# Patient Record
Sex: FEMALE | Race: WHITE | Employment: UNEMPLOYED | URBAN - METROPOLITAN AREA
[De-identification: names, ages, dates, MRNs, and addresses within clinical notes are randomized per-mention and may not be internally consistent; named-entity substitution may affect disease eponyms.]

---

## 2020-05-29 ENCOUNTER — ED HISTORICAL/CONVERTED ENCOUNTER (OUTPATIENT)
Dept: OTHER | Age: 25
End: 2020-05-29

## 2022-02-02 ENCOUNTER — HOSPITAL ENCOUNTER (INPATIENT)
Age: 27
LOS: 4 days | Discharge: HOME | DRG: 885 | End: 2022-02-07
Attending: EMERGENCY MEDICINE | Admitting: PSYCHIATRY & NEUROLOGY
Payer: COMMERCIAL

## 2022-02-02 ENCOUNTER — APPOINTMENT (OUTPATIENT)
Dept: CT IMAGING | Age: 27
DRG: 885 | End: 2022-02-02
Attending: EMERGENCY MEDICINE
Payer: COMMERCIAL

## 2022-02-02 DIAGNOSIS — F32.1 CURRENT MODERATE EPISODE OF MAJOR DEPRESSIVE DISORDER, UNSPECIFIED WHETHER RECURRENT (HCC): Primary | ICD-10-CM

## 2022-02-02 DIAGNOSIS — R45.851 SUICIDAL IDEATION: ICD-10-CM

## 2022-02-02 LAB
ANION GAP SERPL CALC-SCNC: 2 MMOL/L (ref 5–15)
BASOPHILS # BLD: 0 K/UL (ref 0–0.1)
BASOPHILS NFR BLD: 0 % (ref 0–1)
BUN SERPL-MCNC: 10 MG/DL (ref 6–20)
BUN/CREAT SERPL: 10 (ref 12–20)
CA-I BLD-MCNC: 9 MG/DL (ref 8.5–10.1)
CHLORIDE SERPL-SCNC: 110 MMOL/L (ref 97–108)
CO2 SERPL-SCNC: 25 MMOL/L (ref 21–32)
CREAT SERPL-MCNC: 0.97 MG/DL (ref 0.55–1.02)
DIFFERENTIAL METHOD BLD: ABNORMAL
EOSINOPHIL # BLD: 0.1 K/UL (ref 0–0.4)
EOSINOPHIL NFR BLD: 1 % (ref 0–7)
ERYTHROCYTE [DISTWIDTH] IN BLOOD BY AUTOMATED COUNT: 12.4 % (ref 11.5–14.5)
GLUCOSE SERPL-MCNC: 80 MG/DL (ref 65–100)
HCT VFR BLD AUTO: 45.8 % (ref 35–47)
HGB BLD-MCNC: 15.5 G/DL (ref 11.5–16)
IMM GRANULOCYTES # BLD AUTO: 0 K/UL (ref 0–0.04)
IMM GRANULOCYTES NFR BLD AUTO: 0 % (ref 0–0.5)
LYMPHOCYTES # BLD: 2.5 K/UL (ref 0.8–3.5)
LYMPHOCYTES NFR BLD: 19 % (ref 12–49)
MCH RBC QN AUTO: 31.1 PG (ref 26–34)
MCHC RBC AUTO-ENTMCNC: 33.8 G/DL (ref 30–36.5)
MCV RBC AUTO: 91.8 FL (ref 80–99)
MONOCYTES # BLD: 0.6 K/UL (ref 0–1)
MONOCYTES NFR BLD: 5 % (ref 5–13)
NEUTS SEG # BLD: 9.6 K/UL (ref 1.8–8)
NEUTS SEG NFR BLD: 75 % (ref 32–75)
NRBC # BLD: 0 K/UL (ref 0–0.01)
NRBC BLD-RTO: 0 PER 100 WBC
PLATELET # BLD AUTO: 285 K/UL (ref 150–400)
PMV BLD AUTO: 10.1 FL (ref 8.9–12.9)
POTASSIUM SERPL-SCNC: 3.9 MMOL/L (ref 3.5–5.1)
RBC # BLD AUTO: 4.99 M/UL (ref 3.8–5.2)
SODIUM SERPL-SCNC: 137 MMOL/L (ref 136–145)
WBC # BLD AUTO: 12.8 K/UL (ref 3.6–11)

## 2022-02-02 PROCEDURE — 87636 SARSCOV2 & INF A&B AMP PRB: CPT

## 2022-02-02 PROCEDURE — 81025 URINE PREGNANCY TEST: CPT

## 2022-02-02 PROCEDURE — 36415 COLL VENOUS BLD VENIPUNCTURE: CPT

## 2022-02-02 PROCEDURE — 80177 DRUG SCRN QUAN LEVETIRACETAM: CPT

## 2022-02-02 PROCEDURE — 70450 CT HEAD/BRAIN W/O DYE: CPT

## 2022-02-02 PROCEDURE — 85025 COMPLETE CBC W/AUTO DIFF WBC: CPT

## 2022-02-02 PROCEDURE — 80143 DRUG ASSAY ACETAMINOPHEN: CPT

## 2022-02-02 PROCEDURE — 80179 DRUG ASSAY SALICYLATE: CPT

## 2022-02-02 PROCEDURE — 80307 DRUG TEST PRSMV CHEM ANLYZR: CPT

## 2022-02-02 PROCEDURE — 81003 URINALYSIS AUTO W/O SCOPE: CPT

## 2022-02-02 PROCEDURE — 80048 BASIC METABOLIC PNL TOTAL CA: CPT

## 2022-02-02 PROCEDURE — 93005 ELECTROCARDIOGRAM TRACING: CPT

## 2022-02-02 PROCEDURE — 99284 EMERGENCY DEPT VISIT MOD MDM: CPT

## 2022-02-02 RX ORDER — ALPRAZOLAM 1 MG/1
1 TABLET ORAL 3 TIMES DAILY
COMMUNITY
End: 2022-02-07

## 2022-02-02 RX ORDER — NORTRIPTYLINE HYDROCHLORIDE 75 MG/1
100 CAPSULE ORAL
Status: ON HOLD | COMMUNITY
End: 2022-02-07 | Stop reason: SDUPTHER

## 2022-02-02 RX ORDER — FREMANEZUMAB-VFRM 225 MG/1.5ML
225 INJECTION SUBCUTANEOUS
COMMUNITY
Start: 2022-01-17 | End: 2022-02-07

## 2022-02-02 RX ORDER — BENZONATATE 100 MG/1
CAPSULE ORAL
COMMUNITY
End: 2022-02-07

## 2022-02-02 RX ORDER — ESCITALOPRAM OXALATE 20 MG/1
TABLET ORAL
COMMUNITY
End: 2022-02-07

## 2022-02-02 RX ORDER — METOPROLOL TARTRATE 25 MG/1
12.5 TABLET, FILM COATED ORAL 2 TIMES DAILY
COMMUNITY
End: 2022-02-07

## 2022-02-02 RX ORDER — TOPIRAMATE 100 MG/1
100 TABLET, FILM COATED ORAL 2 TIMES DAILY
COMMUNITY
Start: 2021-06-07 | End: 2022-02-07

## 2022-02-02 RX ORDER — ESTRADIOL VALERATE AND ESTRADIOL VALERATE/DIENOGEST 3-2-1(28)
1 KIT ORAL DAILY
COMMUNITY
Start: 2021-05-05

## 2022-02-02 RX ORDER — ONDANSETRON 4 MG/1
TABLET, ORALLY DISINTEGRATING ORAL
COMMUNITY
End: 2022-02-07

## 2022-02-02 RX ORDER — LEVETIRACETAM 500 MG/1
500 TABLET, EXTENDED RELEASE ORAL 2 TIMES DAILY
COMMUNITY
End: 2022-02-07

## 2022-02-03 PROBLEM — R45.851 SUICIDAL IDEATIONS: Status: ACTIVE | Noted: 2022-02-03

## 2022-02-03 PROBLEM — F32.9 MAJOR DEPRESSION: Status: ACTIVE | Noted: 2022-02-03

## 2022-02-03 PROBLEM — T14.91XA SUICIDE ATTEMPT (HCC): Status: ACTIVE | Noted: 2022-02-03

## 2022-02-03 LAB
AMPHET UR QL SCN: NEGATIVE
APAP SERPL-MCNC: <10 UG/ML (ref 10–30)
APPEARANCE UR: CLEAR
ATRIAL RATE: 79 BPM
BACTERIA URNS QL MICRO: ABNORMAL /HPF
BARBITURATES UR QL SCN: NEGATIVE
BENZODIAZ UR QL: POSITIVE
BILIRUB UR QL: NEGATIVE
CALCULATED P AXIS, ECG09: 67 DEGREES
CALCULATED R AXIS, ECG10: 74 DEGREES
CALCULATED T AXIS, ECG11: 48 DEGREES
CANNABINOIDS UR QL SCN: POSITIVE
COCAINE UR QL SCN: NEGATIVE
COLOR UR: ABNORMAL
DIAGNOSIS, 93000: NORMAL
DRUG SCRN COMMENT,DRGCM: ABNORMAL
FLUAV RNA SPEC QL NAA+PROBE: NOT DETECTED
FLUBV RNA SPEC QL NAA+PROBE: NOT DETECTED
GLUCOSE UR STRIP.AUTO-MCNC: NEGATIVE MG/DL
HCG UR QL: NEGATIVE
HGB UR QL STRIP: NEGATIVE
KETONES UR QL STRIP.AUTO: NEGATIVE MG/DL
LEUKOCYTE ESTERASE UR QL STRIP.AUTO: NEGATIVE
METHADONE UR QL: NEGATIVE
NITRITE UR QL STRIP.AUTO: NEGATIVE
OPIATES UR QL: NEGATIVE
P-R INTERVAL, ECG05: 120 MS
PCP UR QL: NEGATIVE
PH UR STRIP: 6 [PH] (ref 5–8)
PROT UR STRIP-MCNC: NEGATIVE MG/DL
Q-T INTERVAL, ECG07: 448 MS
QRS DURATION, ECG06: 90 MS
QTC CALCULATION (BEZET), ECG08: 513 MS
RBC #/AREA URNS HPF: ABNORMAL /HPF (ref 0–5)
SALICYLATES SERPL-MCNC: <1.7 MG/DL (ref 2.8–20)
SARS-COV-2, COV2: NOT DETECTED
SP GR UR REFRACTOMETRY: 1 (ref 1–1.03)
UROBILINOGEN UR QL STRIP.AUTO: 0.1 EU/DL (ref 0.1–1)
VENTRICULAR RATE, ECG03: 79 BPM
WBC URNS QL MICRO: ABNORMAL /HPF (ref 0–4)

## 2022-02-03 PROCEDURE — 65220000003 HC RM SEMIPRIVATE PSYCH

## 2022-02-03 PROCEDURE — 74011250637 HC RX REV CODE- 250/637: Performed by: PSYCHIATRY & NEUROLOGY

## 2022-02-03 RX ORDER — LORAZEPAM 2 MG/ML
1 INJECTION INTRAMUSCULAR
Status: DISCONTINUED | OUTPATIENT
Start: 2022-02-03 | End: 2022-02-07 | Stop reason: HOSPADM

## 2022-02-03 RX ORDER — ACETAMINOPHEN 325 MG/1
650 TABLET ORAL
Status: DISCONTINUED | OUTPATIENT
Start: 2022-02-03 | End: 2022-02-07 | Stop reason: HOSPADM

## 2022-02-03 RX ORDER — TRAZODONE HYDROCHLORIDE 50 MG/1
50 TABLET ORAL
Status: DISCONTINUED | OUTPATIENT
Start: 2022-02-03 | End: 2022-02-07 | Stop reason: HOSPADM

## 2022-02-03 RX ORDER — POTASSIUM CHLORIDE 20 MEQ/1
20 TABLET, EXTENDED RELEASE ORAL DAILY
Status: DISCONTINUED | OUTPATIENT
Start: 2022-02-04 | End: 2022-02-07 | Stop reason: HOSPADM

## 2022-02-03 RX ORDER — TRAZODONE HYDROCHLORIDE 50 MG/1
50 TABLET ORAL
Status: DISCONTINUED | OUTPATIENT
Start: 2022-02-03 | End: 2022-02-03

## 2022-02-03 RX ORDER — SPIRONOLACTONE 25 MG/1
100 TABLET ORAL DAILY
Status: DISCONTINUED | OUTPATIENT
Start: 2022-02-03 | End: 2022-02-07 | Stop reason: HOSPADM

## 2022-02-03 RX ORDER — LANOLIN ALCOHOL/MO/W.PET/CERES
3 CREAM (GRAM) TOPICAL
Status: DISCONTINUED | OUTPATIENT
Start: 2022-02-03 | End: 2022-02-07 | Stop reason: HOSPADM

## 2022-02-03 RX ORDER — METOPROLOL TARTRATE 25 MG/1
12.5 TABLET, FILM COATED ORAL 2 TIMES DAILY
Status: DISCONTINUED | OUTPATIENT
Start: 2022-02-03 | End: 2022-02-07 | Stop reason: HOSPADM

## 2022-02-03 RX ORDER — ADHESIVE BANDAGE
30 BANDAGE TOPICAL DAILY PRN
Status: DISCONTINUED | OUTPATIENT
Start: 2022-02-03 | End: 2022-02-07 | Stop reason: HOSPADM

## 2022-02-03 RX ORDER — HYDROXYZINE 50 MG/1
50 TABLET, FILM COATED ORAL
Status: DISCONTINUED | OUTPATIENT
Start: 2022-02-03 | End: 2022-02-07 | Stop reason: HOSPADM

## 2022-02-03 RX ORDER — TOPIRAMATE 100 MG/1
100 TABLET, FILM COATED ORAL 2 TIMES DAILY
Status: DISCONTINUED | OUTPATIENT
Start: 2022-02-03 | End: 2022-02-07 | Stop reason: HOSPADM

## 2022-02-03 RX ORDER — BUSPIRONE HYDROCHLORIDE 30 MG/1
30 TABLET ORAL 2 TIMES DAILY
COMMUNITY
End: 2022-02-07

## 2022-02-03 RX ORDER — ARIPIPRAZOLE 2 MG/1
2 TABLET ORAL DAILY
Status: DISCONTINUED | OUTPATIENT
Start: 2022-02-04 | End: 2022-02-07 | Stop reason: HOSPADM

## 2022-02-03 RX ORDER — OLANZAPINE 5 MG/1
5 TABLET ORAL
Status: DISCONTINUED | OUTPATIENT
Start: 2022-02-03 | End: 2022-02-07 | Stop reason: HOSPADM

## 2022-02-03 RX ORDER — OMEPRAZOLE 40 MG/1
40 CAPSULE, DELAYED RELEASE ORAL 2 TIMES DAILY
COMMUNITY
End: 2022-02-07

## 2022-02-03 RX ORDER — PANTOPRAZOLE SODIUM 40 MG/1
40 TABLET, DELAYED RELEASE ORAL
Status: DISCONTINUED | OUTPATIENT
Start: 2022-02-03 | End: 2022-02-07 | Stop reason: HOSPADM

## 2022-02-03 RX ORDER — HALOPERIDOL 5 MG/ML
5 INJECTION INTRAMUSCULAR
Status: DISCONTINUED | OUTPATIENT
Start: 2022-02-03 | End: 2022-02-03

## 2022-02-03 RX ORDER — HYDROXYZINE 50 MG/1
50 TABLET, FILM COATED ORAL
Status: DISCONTINUED | OUTPATIENT
Start: 2022-02-03 | End: 2022-02-03

## 2022-02-03 RX ORDER — ACETAMINOPHEN 325 MG/1
650 TABLET ORAL
Status: DISCONTINUED | OUTPATIENT
Start: 2022-02-03 | End: 2022-02-03

## 2022-02-03 RX ORDER — SPIRONOLACTONE 100 MG/1
100 TABLET, FILM COATED ORAL DAILY
COMMUNITY
End: 2022-02-07

## 2022-02-03 RX ORDER — ADHESIVE BANDAGE
30 BANDAGE TOPICAL DAILY PRN
Status: DISCONTINUED | OUTPATIENT
Start: 2022-02-03 | End: 2022-02-03

## 2022-02-03 RX ORDER — LEVETIRACETAM 500 MG/1
500 TABLET ORAL 2 TIMES DAILY
Status: DISCONTINUED | OUTPATIENT
Start: 2022-02-03 | End: 2022-02-07 | Stop reason: HOSPADM

## 2022-02-03 RX ORDER — ALPRAZOLAM 0.25 MG/1
0.5 TABLET ORAL 4 TIMES DAILY
Status: DISCONTINUED | OUTPATIENT
Start: 2022-02-03 | End: 2022-02-07 | Stop reason: HOSPADM

## 2022-02-03 RX ORDER — ESCITALOPRAM OXALATE 10 MG/1
20 TABLET ORAL DAILY
Status: DISCONTINUED | OUTPATIENT
Start: 2022-02-03 | End: 2022-02-07 | Stop reason: HOSPADM

## 2022-02-03 RX ADMIN — PANTOPRAZOLE SODIUM 40 MG: 40 TABLET, DELAYED RELEASE ORAL at 16:33

## 2022-02-03 RX ADMIN — ALPRAZOLAM 0.5 MG: 0.25 TABLET ORAL at 21:43

## 2022-02-03 RX ADMIN — ESCITALOPRAM OXALATE 20 MG: 10 TABLET ORAL at 16:33

## 2022-02-03 RX ADMIN — MELATONIN TAB 3 MG 3 MG: 3 TAB at 21:44

## 2022-02-03 RX ADMIN — ALPRAZOLAM 0.5 MG: 0.25 TABLET ORAL at 16:33

## 2022-02-03 RX ADMIN — METOPROLOL TARTRATE 12.5 MG: 25 TABLET, FILM COATED ORAL at 21:43

## 2022-02-03 RX ADMIN — SPIRONOLACTONE 100 MG: 25 TABLET ORAL at 16:33

## 2022-02-03 RX ADMIN — LEVETIRACETAM 500 MG: 500 TABLET, FILM COATED ORAL at 21:45

## 2022-02-03 RX ADMIN — TOPIRAMATE 100 MG: 100 TABLET, FILM COATED ORAL at 21:44

## 2022-02-03 NOTE — CONSULTS
Consult Date: 2/3/2022    Chief Complaint: Gait problem  Chief Complaint   Patient presents with    Mental Health Problem       HPI: HPI patient is a 23years old white female who has been admitted here for psychiatry evaluation and treatment. For suicidal ideation. She took quite a few of Lawence Glee as well as Lexapro and took 6 BuSpar patient has history of most likely subdural hemorrhage and brain surgery in 2014. After that she has history of gait problem also. No history of chest pain cough fever or chills. No history of shortness of breath no history of nausea vomiting diarrhea abdominal pain or black stool no history of increased frequency micturition or painful micturition. No history of joint pain or joint swelling. No history of headache. No history of loss of consciousness or seizures. No history of change in vision no history of ear or nasal discharge no history of throat pain or earache. ROS:ROS   Constitutional: Negative  HEENT: Negative  CVS: Negative  RS: Negative  GI: Negative  : Negative  Musculoskeletal: Negative  Immunology: Records are not available  Neurology: Gait problem  Endocrine: Negative  Haem-Onc: Negative  Skin: Negative  Psychiatry: Depression with suicidal ideation  Allergies  Allergies   Allergen Reactions    Contrast Agent [Iodine] Unknown (comments)     FAMILY HISTORY:  History reviewed. No pertinent family history.   Father has hypertension as well as type 2 diabetes mother had some kind of bone cancer  SOCIAL HISTORY:  Social History     Socioeconomic History    Marital status: SINGLE     Spouse name: Not on file    Number of children: Not on file    Years of education: Not on file    Highest education level: Not on file   Occupational History    Not on file   Tobacco Use    Smoking status: Never Smoker    Smokeless tobacco: Never Used   Substance and Sexual Activity    Alcohol use: Not Currently    Drug use: Yes     Types: Marijuana    Sexual activity: Not on file Other Topics Concern    Not on file   Social History Narrative    Not on file     Social Determinants of Health     Financial Resource Strain:     Difficulty of Paying Living Expenses: Not on file   Food Insecurity:     Worried About Running Out of Food in the Last Year: Not on file    Adonis of Food in the Last Year: Not on file   Transportation Needs:     Lack of Transportation (Medical): Not on file    Lack of Transportation (Non-Medical): Not on file   Physical Activity:     Days of Exercise per Week: Not on file    Minutes of Exercise per Session: Not on file   Stress:     Feeling of Stress : Not on file   Social Connections:     Frequency of Communication with Friends and Family: Not on file    Frequency of Social Gatherings with Friends and Family: Not on file    Attends Congregation Services: Not on file    Active Member of [address] [place] [address] or Organizations: Not on file    Attends Club or Organization Meetings: Not on file    Marital Status: Not on file   Intimate Partner Violence:     Fear of Current or Ex-Partner: Not on file    Emotionally Abused: Not on file    Physically Abused: Not on file    Sexually Abused: Not on file   Housing Stability:     Unable to Pay for Housing in the Last Year: Not on file    Number of [place] in the Last Year: Not on file    Unstable Housing in the Last Year: Not on file     No history of alcohol abuse no history of smoking positive history of smoking marijuana occasionally  SURGICAL HISTORY:  Past Surgical History:   Procedure Laterality Date    HX HEENT       tonsilectomy and adenoidectomy     PMH:  Past Medical History:   Diagnosis Date    Other ill-defined conditions(799.89) 10/2010    left tympanic membrane defect    Other ill-defined conditions(799.89)     TM perforation    Suicidal behavior with attempted self-injury ([facility] [address]     Suicidal ideation      Home Medications   Prior to Admission medications    Medication Sig Start Date End Date Taking? Authorizing Provider   Estradiol Valerate-Dienogest Chuck Montgomerys) 3 mg/2 mg-2 mg/ 2 mg-3 mg/1 mg tab Take 1 Tablet by mouth daily. 5/5/21  Yes Other, MD roosevelt Muhammadumab-vfrm (Ajovy Syringe) 225 mg/1.5 mL syrg injection 225 mg by SubCUTAneous route. 1/17/22  Yes Other, MD Sabina   topiramate (TOPAMAX) 100 mg tablet topiramate 100 mg tablet 6/7/21  Yes Other, MD Sabina   ubrogepant Shelvy Heaps) 100 mg tablet Take 100 mg by mouth daily as needed. 12/28/21 3/28/22 Yes Other, MD Sabina   ALPRAZolam (Xanax) 1 mg tablet     Other, MD Sabina   benzonatate (TESSALON) 100 mg capsule benzonatate 100 mg capsule    Other, MD Sabina   cariprazine (Vraylar) 4.5 mg capsule     Other, MD Sabina   escitalopram oxalate (Lexapro) 20 mg tablet     Other, MD Sabina   levETIRAcetam (keppra xr) 500 mg ER tablet     Other, MD Sabina   linaCLOtide (Linzess) 290 mcg cap capsule Linzess 290 mcg capsule    Other, MD Sabina   metoprolol tartrate (LOPRESSOR) 25 mg tablet     Other, MD Sabina   sucralfate (CARAFATE PO) Carafate    Other, MD Sabina   POTASSIUM CARBONATE     Other, MD Sabina   ondansetron (ZOFRAN ODT) 4 mg disintegrating tablet ondansetron 4 mg disintegrating tablet    Other, MD Sabina   nortriptyline (PAMELOR) 75 mg capsule nortriptyline 75 mg capsule    Other, MD Sabina   CETIRIZINE HCL (ZYRTEC PO) Take  by mouth.     Provider, Historical     Vitals:  Patient Vitals for the past 24 hrs:   Temp Pulse Resp BP SpO2   02/03/22 1121 98.2 °F (36.8 °C) 73 16 120/76 --   02/03/22 1000 -- 73 -- 120/76 --   02/03/22 0807 -- 84 16 115/80 99 %   02/02/22 1921 98.2 °F (36.8 °C) 85 18 118/65 100 %        General Examination: Physical Exam patient is a 23years old white female moderately obese not in any acute distress alert awake oriented x3  HEENT: Normocephalic atraumatic skull pupils are bilaterally equal reactive to light sclera nonicteric conjunctive pink no edema of the eyelids no yellow nasal discharge tongue is pink no ulcer positive gag reflex no pharyngeal inflammation extraocular movements are intact  Neck: Supple full range of motion no JVD no HJR no lymphadenopathy no thyromegaly no carotid bruit  Chest: Expands well no localized swelling or tenderness  RS: Clear breath sounds no rhonchi no rales  CVS: S1-S2 audible regular no murmur gallop or pericardial rub  Abdomen: Obese bowel sounds are positive no organomegaly no tenderness  Extremeties: No edema no clubbing no cyanosis peripheral pulses 2+  CNS: Grossly unremarkable cranial nerves I to XII are individually checked and they are intact muscle power is 5/5 reflexes 2+ plantars downgoing no sensory abnormality or deficit  Surprisingly Romberg sign is negative  Finger-to-nose test is negative        LABS: WBC count 12.8    CXR Results  (Last 48 hours)    None          No results found for this or any previous visit (from the past 12 hour(s)). CT HEAD WO CONT   Final Result   No acute or active intracranial process. ASSESSMENT/PLAN: Leukocytosis  Seizures  History of brain surgery  Obesity  Overdose-multiple medications  Depression  Patient is to be on fall precautions.   Please make sure patient gets her [address] [place] [address] Drive to diet and exercise  She is medically stable for psychiatry evaluation and treatment she can participate in all activities without any reservations        12:56 PM, 02/03/22  Simeon Severance, MD

## 2022-02-03 NOTE — ED PROVIDER NOTES
EMERGENCY DEPARTMENT HISTORY AND PHYSICAL EXAM      Date: 2/2/2022  Patient Name: Mauro Amezcua    History of Presenting Illness     Chief Complaint   Patient presents with   [address] Problem       History Provided By: Patient    HPI: Mauro Amezcua, 23 y.o. female with a past medical history significant Psychiatric illness presents to the ED with cc of suicide attempt. Patient reports taking extra medicine approximate 24 hours ago to try to kill herself. Patient denies any somatic complaints at this time. Patient does still report suicidality. There are no other complaints, changes, or physical findings at this time. PCP: Dixie Arceo MD    No current facility-administered medications on file prior to encounter. Current Outpatient Medications on File Prior to Encounter   Medication Sig Dispense Refill    omeprazole (PRILOSEC) 40 mg capsule Take 40 mg by mouth two (2) times a day. Indications: stress ulcer prevention      spironolactone (ALDACTONE) 100 mg tablet Take 100 mg by mouth daily. Indications: high blood pressure      busPIRone (BUSPAR) 30 mg tablet Take 30 mg by mouth two (2) times a day. Indications: repeated episodes of anxiety      ALPRAZolam (Xanax) 1 mg tablet Take 1 mg by mouth three (3) times daily. Indications: anxious      cariprazine (Vraylar) 4.5 mg capsule Take 4.5 mg by mouth daily.  escitalopram oxalate (Lexapro) 20 mg tablet       Estradiol Valerate-Dienogest (Natazia) 3 mg/2 mg-2 mg/ 2 mg-3 mg/1 mg tab Take 1 Tablet by mouth daily.  levETIRAcetam (keppra xr) 500 mg ER tablet Take 500 mg by mouth two (2) times a day. Indications: additional medication for myoclonic epilepsy      metoprolol tartrate (LOPRESSOR) 25 mg tablet Take 12.5 mg by mouth two (2) times a day. Indications: rapid ventricular heartbeat      topiramate (TOPAMAX) 100 mg tablet Take 100 mg by mouth two (2) times a day.  Indications: migraine prevention      POTASSIUM CARBONATE Take 20 mEq by mouth daily.  nortriptyline (PAMELOR) 75 mg capsule Take 100 mg by mouth nightly. Indications: depression      benzonatate (TESSALON) 100 mg capsule benzonatate 100 mg capsule (Patient not taking: Reported on 2/3/2022)      fremanezumab-vfrm (Ajovy Syringe) 225 mg/1.5 mL syrg injection 225 mg by SubCUTAneous route. (Patient not taking: Reported on 2/3/2022)      linaCLOtide (Linzess) 290 mcg cap capsule Linzess 290 mcg capsule (Patient not taking: Reported on 2/3/2022)      sucralfate (CARAFATE PO) Carafate (Patient not taking: Reported on 2/3/2022)      ondansetron (ZOFRAN ODT) 4 mg disintegrating tablet ondansetron 4 mg disintegrating tablet (Patient not taking: Reported on 2/3/2022)      ubrogepant Lendon Drought) 100 mg tablet Take 100 mg by mouth daily as needed. (Patient not taking: Reported on 2/3/2022)      CETIRIZINE HCL (ZYRTEC PO) Take  by mouth. (Patient not taking: Reported on 2/3/2022)         Past History     Past Medical History:  Past Medical History:   Diagnosis Date    Other ill-defined conditions(799.89) 10/2010    left tympanic membrane defect    Other ill-defined conditions(799.89)     TM perforation    Suicidal behavior with attempted self-injury ([facility] [address]     Suicidal ideation        Past Surgical History:  Past Surgical History:   Procedure Laterality Date    HX HEENT       tonsilectomy and adenoidectomy       Family History:  History reviewed. No pertinent family history. Social History:  Social History     Tobacco Use    Smoking status: Never Smoker    Smokeless tobacco: Never Used   Substance Use Topics    Alcohol use: Not Currently    Drug use: Yes     Types: Marijuana       Allergies: Allergies   Allergen Reactions    Contrast Agent [Iodine] Unknown (comments)         Review of Systems   Review of Systems   Constitutional: Negative for chills and fever. HENT: Negative for sinus pressure and sinus pain. Eyes: Negative for photophobia and redness. Respiratory: Negative for shortness of breath and wheezing. Cardiovascular: Negative for chest pain and palpitations. Gastrointestinal: Negative for abdominal pain and nausea. Genitourinary: Negative for flank pain and hematuria. Musculoskeletal: Negative for arthralgias and gait problem. Skin: Negative for color change and pallor. Neurological: Negative for dizziness and weakness. Review of Systems    Physical Exam   Physical Exam  Constitutional:       General: No acute distress. Appearance: Normal appearance. Not toxic-appearing. HENT:      Head: Normocephalic and atraumatic. Nose: Nose normal.      Mouth/Throat:      Mouth: Mucous membranes are moist.   Eyes:      Extraocular Movements: Extraocular movements intact. Pupils: Pupils are equal, round, and reactive to light. Cardiovascular:      Rate and Rhythm: Normal rate. Pulses: Normal pulses. Pulmonary:      Effort: Pulmonary effort is normal.      Breath sounds: No stridor. Abdominal:      General: Abdomen is flat. There is no distension. Musculoskeletal:         General: Normal range of motion. Cervical back: Normal range of motion and neck supple. Skin:     General: Skin is warm and dry. Capillary Refill: Capillary refill takes less than 2 seconds. Neurological:      General: No focal deficit present. Mental Status: Aert and oriented to person, place, and time. Psychiatric:         Mood and Affect: Depressed mood, flat affect, suicidal         Behavior: Behavior normal.     Physical Exam    Lab and Diagnostic Study Results     Labs -   No results found for this or any previous visit (from the past 12 hour(s)). Radiologic Studies -   @lastxrresult@  CT Results  (Last 48 hours)    None        CXR Results  (Last 48 hours)    None            Medical Decision Making   - I am the first provider for this patient.     - I reviewed the vital signs, available nursing notes, past medical history, past surgical history, family history and social history. - Initial assessment performed. The patients presenting problems have been discussed, and they are in agreement with the care plan formulated and outlined with them. I have encouraged them to ask questions as they arise throughout their visit. Vital Signs-Reviewed the patient's vital signs. Patient Vitals for the past 12 hrs:   Pulse BP   02/07/22 0918 68 114/81     ED Course:     ED Course as of 02/07/22 0948 Wed Feb 02, 2022   2259 Received in sign out. SI. Medically cleared after UA results. Planned admit. [LW]      ED Course User Index  [LW] Cyn James MD       Provider Notes (Medical Decision Making): MDM       Procedures   Medical Decision Makingedical Decision Making  Performed by: Avsi Arana MD  PROCEDURES:  Procedures       Disposition   Disposition: Admitted to [address] [place] [address] at [facility] the case was discussed with the admitting physician     Isaiah Enriquez   Details       Diagnosis     Clinical Impression:   1. Suicidal ideation        Attestations:    Avis Arana MD    Please note that this dictation was completed with [employer], the computer voice recognition software. Quite often unanticipated grammatical, syntax, homophones, and other interpretive errors are inadvertently transcribed by the computer software. Please disregard these errors. Please excuse any errors that have escaped final proofreading. Thank you.

## 2022-02-03 NOTE — GROUP NOTE
MA[facility]  GROUP DOCUMENTATION INDIVIDUAL                                                                          Group Therapy Note    Date: 2/3/2022    Group Start Time: 1230  Group End Time: 1300  Group Topic: Education Group - Inpatient    [facility]    Ann Silverman    IP [address] [facility] GROUP DOCUMENTATION GROUP    Group Therapy Note    Attendees: 3/7    Psych Ed: pts were asked how they are doing as a check in question. Pts then discussed negative intrusive thoughts and the toll it takes on their self esteem. Writer encouraged pts to focus on positive self esteem and encouraged them to write in their journal. Writer provided prompts to pts and wrote them on the board. Pts finished group by reflecting and then writing something they are proud of themselves for on the board         Attendance: Attended    Patient's Goal:  Attend activities and groups     Interventions/techniques: Validated, Promoted peer support, Provide feedback and Supported    Follows Directions: Followed directions    Interactions: Interacted appropriately    Mental Status: Calm, Congruent, Flat and Worried    Behavior/appearance: Attentive, Neatly groomed and Withdrawn/quiet    Goals Achieved: Able to engage in interactions, Able to listen to others, Able to reflect/comment on own behavior and Able to manage/cope with feelings      Additional Notes:  Pt was quiet but listened to group.  Pt is making some progress towards goals by attending and participating in [address] [place] [address]

## 2022-02-03 NOTE — GROUP NOTE
[facility] GROUP DOCUMENTATION INDIVIDUAL                                                                          Group Therapy Note  Facilitator lead the group in a mindfulness exercise \"Leaves on a Stream\". The group was provided with a handout to continue practicing to reduce stress and increase well-beginning. Date: 2/3/2022    Group Start Time: 9550  Group End Time: 1600  Group Topic: Reflection/Relaxation    [facility] Eligha Chapman    IP [address] [facility] GROUP DOCUMENTATION GROUP    Group Therapy Note    Attendees: 4         Attendance: Did not attend    Additional Notes:  Pt was invited but did not attend.     Moshe Panda

## 2022-02-03 NOTE — BSMART NOTE
Pt arrived at ED via ambulance and assessed in ED 26    Pt presented with SI w/plan and depression     Pt presented with disheveled appearance. Pt thought process blocked and disorganized    Pt cognition impaired decision making and impulsive    Pt reports has been hospitalized. Most Recent Hospitalizations if any: [facility] in 2017    Pt reports Outpatient Psychiatrist Dr. Aziza Saavedra    Pt does not have a hx of legal issues. Pt does not have hx of violence/aggression     Pt reports THC use    Pt UDS positive for: pending    Hx. Of Substance Treatment: NO  When: Not Applicable  Where: Not Applicable    Highest Level of Education: High school    Employment: YES    Source of Income: employment    Housing: Independent Housing    Access to Weapons: NO    If weapons, Have they been removed: NO    Decision Making:    Does Patient have a guardian/POA: NO    If so, Name of Guardian/POA: n/a     Contact Information: n/a      Was Paperwork Provided?: NO     If not, Was it Requested: NO                                                                  Who to Follow Up With for Paperwork: n/a    Was Information Emailed to Director and Supervisor:NO     Trauma Hx:   Sexual: NO  When:  Not Applicable By Whom:Not Applicable    Physical: NO  When: Not Applicable By Whom:Not Applicable    Verbal: NO  When: Not Applicable By Whom:Not Applicable      Family Support: YES    Who: Mother, father, brother and sister      Dr. aJbari Barrientos contacted and reports pt meets inpatient level of care and will be admitted to [address] [place] [address] pending medical clearance      This writer notified assigned TRI Hinojosa. Safety Plan Completed: NO        PATIENT NARRATIVE SUMMARY:    Patient assessed in ER room 32 with her mother and father at bedside. Patient calm and cooperative during assessment. Patient appeared restless with a blank stare and frequent pauses during assessment. Patient states she took \"numerous pills\" this morning around 0630.  She states she took the pills hoping \"to go to sleep and not wake up. \" She states she went to her parents house and got the pills yesterday evening. Patient reports feelings increasingly depressed over the last week. She reports starting on Buspirone about 1 month ago. She denies HI and denies hallucinations. Patient reports psychiatric admission to [address] [place] [address] in 2017 after attempted suicide. She admits to marijuana use \"a few times a week. \" Patient states she is voluntary. This writer will follow up as needed.

## 2022-02-03 NOTE — GROUP NOTE
[facility]  GROUP DOCUMENTATION INDIVIDUAL                                                                          Group Therapy Note    Date: 2/3/2022    Group Start Time: 9592  Group End Time: 1400  Group Topic: Process Group - Inpatient    [facility] [address] [address] GROUP DOCUMENTATION GROUP    Group Therapy Note: Facilitator engaged group in therapeutic discussion about positive traits. Each member identified their traits and were encouraged to share how these traits have helped them in certain situations. The group was educated on \"positive affirmations\" and encouraged to practice saying, \"I am [positive trait]\" to improve self-esteem. Attendees: 2         Attendance: Did not attend      Additional Notes:  Pt was invited but did not attend.      Francoise Duke

## 2022-02-03 NOTE — PROGRESS NOTES
Problem: Suicide  Goal: *STG: Seeks staff when feelings of self harm or harm towards others arise  Outcome: Progressing Towards Goal     Problem: Suicide  Goal: *STG: Attends activities and groups  Outcome: Progressing Towards Goal     Problem: Suicide  Goal: *STG:  Verbalizes alternative ways of dealing with maladaptive feelings/behaviors  Outcome: Progressing Towards Goal     Problem: Suicide  Goal: *STG/LTG: Complies with medication therapy  Outcome: Progressing Towards Goal     Problem: Suicide  Goal: *STG/LTG: No longer expresses self destructive or suicidal thoughts  Outcome: Progressing Towards Goal

## 2022-02-03 NOTE — ED NOTES
Past Medical History:   Diagnosis Date    Other ill-defined conditions(799.89) 10/2010    left tympanic membrane defect    Other ill-defined conditions(799.89)     TM perforation    Suicidal behavior with attempted self-injury ([facility] [address]     Suicidal ideation      Past Surgical History:   Procedure Laterality Date    HX HEENT       tonsilectomy and adenoidectomy     Patient's mother reports patient found around 37 in her home by her father, with vomit in her mouth and apparent bladder incontinence. Father reports \"pills were everywhere\" and noted numerous pill bottles. He reports a \"hole\" in the wall as well and there is concern that patient may have hit her head. Patient's mother states she last talked to patient around 0620 this morning. She states she thought the patient was at work, but became concerned when patient did not respond to her via phone throughout the day. She states patient also had turned off her \"location\" on her phone. She reports patient with Hx of TBI, seizure disorder, tachycardia, depression and anxiety. She reports patient with TBI in 2014 and has \"not been the same\" ever since. She reports patient with suicide attempt (overdose) in 2017 and was hospitalized at [facility]. She states patient is followed by Dr. Nato Alba at [facility] and also see therapist, Mason Pritchett. Patient's mother states that she is in charge of her medications and provides her with weekly amount for safety reasons. However, she states it appears that patient went to parent's home and got all of her pills. Patient's mother provided patient's medication that includes the following: Vraylar 4.5mg, Spironolactone 100mg, Keppra 500 mg, Topamax 100 mg, Xanax 1 mg, Buspirone 30 mg, Lexapro 40 mg, Potassium, and Omeprazole 40 mg. She reports reaching out to patient's provider yesterday as she was concerned for patient's increased depression and felt like she may be over-medicated. She states patient lives alone.  COVID swab, urine and lab work collected and sent to lab for analysis, all belongings inventoried and secured, cabinets locked, all trash bins and loose noticeable cables removed for safety, room behavioral health contacted for eval, plan of care reviewed, pt in close proximity to nurse's station for constant monitoring, safety maintained. 0030  Pt resting comfortably, sleeping but easily aroused, no sign or symptoms of pain or discomfort, VS WNL, frequent checks, bed in lowest position, side rails up x2, call bell within reach, will continue to monitor.

## 2022-02-03 NOTE — ED TRIAGE NOTES
Intentional OD of Xanax, Keppra and Lexapro. Bottles with parents enroute. Pt states she took the pills approx 24hr ago.  Arrives alert and oriented

## 2022-02-03 NOTE — BSMART NOTE
Attempted to assess patient in ER room 26, but patient was unable to complete due to patient sleeping/impaired. ER satff provided patient's information and stated that they were in the waiting room. This writer spoke at [facility] with patient's parents, Vikas Trujillo and Jesse Magana, in ER waiting room. Patient's mother reports patient found around [phone] in her home by her father, with vomit in her mouth and apparent bladder incontinence. Father reports \"pills were everywhere\" and noted numerous pill bottles. He reports a \"hole\" in the wall as well and there is concern that patient may have hit her head. Patient's mother states she last talked to patient around 0620 this morning. She states she thought the patient was at work, but became concerned when patient did not respond to her via phone throughout the day. She states patient also had turned off her \"location\" on her phone. She reports patient with Hx of TBI, seizure disorder, tachycardia, depression and anxiety. She reports patient with TBI in 2014 and has \"not been the same\" ever since. She reports patient with suicide attempt (overdose) in 2017 and was hospitalized at [facility]. She states patient is followed by Dr. Hugh Ambriz at [facility] and also see therapist, Mango Robles. Patient's mother states that she is in charge of her medications and provides her with weekly amount for safety reasons. However, she states it appears that patient went to parent's home and got all of her pills. Patient's mother provided patient's medication that includes the following: Vraylar 4.5mg, Spironolactone 100mg, Keppra 500 mg, Topamax 100 mg, Xanax 1 mg, Buspirone 30 mg, Lexapro 40 mg, Potassium, and Omeprazole 40 mg. She reports reaching out to patient's provider yesterday as she was concerned for patient's increased depression and felt like she may be over-medicated. She states patient lives alone. Informed patient's nurse, Una Scanlon, of information gathered from patient's parents.  Will return to assess when patient is more coherent.             Parents July Marroquin and Tyronne Cooks): [phone]

## 2022-02-03 NOTE — BH NOTES
0900- 23 y.o. female admitted to [address] [place] [address] acute unit under the care of Dr. Arcadio Blackmon with a diagnosis of Major Depression and a suicide attempt via overdose on an unknown amount of her own medication including xanax, keppra, lexapro, topamax, and buspar. Per ED report, father called EMS after finding pt unconscious in her apartment. Pt was covered in vomit and had been in continent of bladder and may have hit her head on the wall as there was evidence of a hole in the sheetrock behind where she was found. Pt mother reported she had not answered her phone all day and had turned location services off on her phone, prompting the father to check on her. PMH  includes a TBI in 2014 that occurred after being given CT contract that she had a severe allergic reaction to that caused her to have a seizure that caused her to fall and hit her head on the floor. Pt reports continued cognitive delays such as slow processing, poor memory and inability to concentrate. Pt also has tachycardia for which she is medicated with Metoprolol. Although patient is living alone, she is very much dependent on her parents. Pt states she felt hopeless, lonely and a failure. 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 Pt denies SI/HI at time of assessment, rates depression 9/10 and anxiety 9.5/10. Was tearful at times and expressed a desire to get help and feel better. Pt states she feels like she is over medicated and is sleepy all day. Reports waking about 0230 am and not being able to return to sleep. Pt states she has no friends and her family are her only support. Prior hospitalization was in 2017 at [facility] for suicide attempt via overdose for similar reasons as this attempt. Vital signs stable, tour of unit provided. Dr. Arcadio Blackmon aware of admission, reviewed home medications- orders received.

## 2022-02-04 PROCEDURE — 74011250637 HC RX REV CODE- 250/637: Performed by: PSYCHIATRY & NEUROLOGY

## 2022-02-04 PROCEDURE — 65220000003 HC RM SEMIPRIVATE PSYCH

## 2022-02-04 RX ORDER — VENLAFAXINE HYDROCHLORIDE 37.5 MG/1
37.5 CAPSULE, EXTENDED RELEASE ORAL
Status: DISCONTINUED | OUTPATIENT
Start: 2022-02-04 | End: 2022-02-07 | Stop reason: HOSPADM

## 2022-02-04 RX ADMIN — SPIRONOLACTONE 100 MG: 25 TABLET ORAL at 08:41

## 2022-02-04 RX ADMIN — VENLAFAXINE HYDROCHLORIDE 37.5 MG: 37.5 CAPSULE, EXTENDED RELEASE ORAL at 13:55

## 2022-02-04 RX ADMIN — PANTOPRAZOLE SODIUM 40 MG: 40 TABLET, DELAYED RELEASE ORAL at 15:47

## 2022-02-04 RX ADMIN — MELATONIN TAB 3 MG 3 MG: 3 TAB at 21:02

## 2022-02-04 RX ADMIN — POTASSIUM CHLORIDE 20 MEQ: 1500 TABLET, EXTENDED RELEASE ORAL at 08:41

## 2022-02-04 RX ADMIN — LEVETIRACETAM 500 MG: 500 TABLET, FILM COATED ORAL at 08:41

## 2022-02-04 RX ADMIN — ALPRAZOLAM 0.5 MG: 0.25 TABLET ORAL at 17:50

## 2022-02-04 RX ADMIN — TOPIRAMATE 100 MG: 100 TABLET, FILM COATED ORAL at 08:41

## 2022-02-04 RX ADMIN — ACETAMINOPHEN 650 MG: 325 TABLET ORAL at 18:32

## 2022-02-04 RX ADMIN — METOPROLOL TARTRATE 12.5 MG: 25 TABLET, FILM COATED ORAL at 21:02

## 2022-02-04 RX ADMIN — LEVETIRACETAM 500 MG: 500 TABLET, FILM COATED ORAL at 21:02

## 2022-02-04 RX ADMIN — ALPRAZOLAM 0.5 MG: 0.25 TABLET ORAL at 08:41

## 2022-02-04 RX ADMIN — ALPRAZOLAM 0.5 MG: 0.25 TABLET ORAL at 13:56

## 2022-02-04 RX ADMIN — ESCITALOPRAM OXALATE 20 MG: 10 TABLET ORAL at 08:41

## 2022-02-04 RX ADMIN — ALPRAZOLAM 0.5 MG: 0.25 TABLET ORAL at 21:02

## 2022-02-04 RX ADMIN — PANTOPRAZOLE SODIUM 40 MG: 40 TABLET, DELAYED RELEASE ORAL at 08:41

## 2022-02-04 RX ADMIN — ARIPIPRAZOLE 2 MG: 2 TABLET ORAL at 08:41

## 2022-02-04 RX ADMIN — TOPIRAMATE 100 MG: 100 TABLET, FILM COATED ORAL at 21:02
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
## 2022-02-04 NOTE — BH NOTES
DX: MAJOR DEPRESSION    Affect restricted. Soft spoken. Denied feeling depressed. Rated her anxiety level 6-7/10. Denied having thoughts to self harm. Compliant with scheduled meds. Consumed 3/4 of meals. Encouraged to shower and change her gowns. Encouraged to attend groups. Q 15 mins checks maintained, for safety. 1753 Pt's roommate reported pt was in bed crying. When checked on pt stated, \"I miss my mother. I was talking to her on the phone. \" Ventilated her feelings and accepted 1800 scheduled Xanax. Cooperative when asked to not isolate, and to spend x, in the day room, with others.

## 2022-02-04 NOTE — GROUP NOTE
MAYKEL  GROUP DOCUMENTATION INDIVIDUAL                                                                          Group Therapy Note    Date: 2/4/2022    Group Start Time: 7312  Group End Time: 1891  Group Topic: Process Group - Inpatient    SRM [facility] ACUTE    Jaclyn Alva    IP [address] [facility] GROUP DOCUMENTATION GROUP    Group Therapy Note    Attendees: 5/10         Attendance: Did not attend        Additional Notes:  Pt was invited and encouraged to attend group but chose not to attend.      Jayden Daly

## 2022-02-04 NOTE — BH NOTES
Patient up in the mileau at the beginning of the shift. Quiet. Self-reserved. Interactive and talkative when approached. Patient is soft spoken. Pleasant and calm. Cooperative. Patient denies SI,HI and AVH's. Eye contact is good. Thoughts are well-organized. Surendra Ramandeep is delayed but appropriate. Some thought blocking when attempting  to talk. Denies pain. Comfortable. Med-diet compliant. Has not attended PSR yet Educated on what PSR is and stressed the importance of attendance. Denies pain. Comfortable. No seizure activity witnessed.

## 2022-02-04 NOTE — PROGRESS NOTES
Problem: Depressed Mood (Adult/Pediatric)  Goal: *STG: Remains safe in hospital  Outcome: Progressing Towards Goal     Problem: Depressed Mood (Adult/Pediatric)  Goal: *STG: Complies with medication therapy  Outcome: Progressing Towards Goal   Patient monitored Q15\" for patient safety and security. Patient informed of evening medications and states she will be med compliant.

## 2022-02-04 NOTE — GROUP NOTE
IP  GROUP DOCUMENTATION INDIVIDUAL                                                                          Group Therapy Note    Date: 2/4/2022    Group Start Time: 6005  Group End Time: 1062  Group Topic: Nursing     Se Anoop Mayfield, RN    IP 1150 [facility] GROUP DOCUMENTATION GROUP    Group Therapy Note    Attendees: 3         Attendance: Did not attend    Patient's Goal:  To understand the importance of medication compliance. Interventions/techniques: Follows Directions:     Interactions:     Mental Status:     Behavior/appearance:     Goals Achieved: Additional Notes:  Encouraged, patient remained in her room.     Freddie Woody RN
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
## 2022-02-04 NOTE — BH NOTES
Collateral    Writer spoke with pts mother, Donte Mensah. Donte Mensah reports that the pt has been struggling with depression since her TBI. Donte Mensah said that she feels that the pt is on too many psychotropic medications and that she is having trouble 'feeling her emotions'. Donte Mensah said that she is getting the pt a new psychiatrist through [facility] named Dr Joann Rivera. Pt will be staying with parents after discharge and mother is in support of [facility] program. Family session set for Monday.  Donte Mensah said she will bring some books for pt and denys ardon

## 2022-02-04 NOTE — GROUP NOTE
IP  GROUP DOCUMENTATION INDIVIDUAL                                                                          Group Therapy Note    Date: 2/4/2022    Group Start Time: 0935  Group End Time: 2688  Group Topic: Community Meeting     Se Anoop Mayfield RN    IP 1[address] [facility] GROUP DOCUMENTATION GROUP    Group Therapy Note    Attendees: 3         Attendance: Did not attend    Patient's Goal:      Interventions/techniques: Follows Directions:     Interactions:     Mental Status:     Behavior/appearance:     Goals Achieved: Additional Notes:  Encouraged, patient remained in her room.     Eden Huggins RN

## 2022-02-04 NOTE — BH NOTES
PSA PART II ADDITIONAL INFORMATION        Access To [facility]: No    Substance Use: YES    Last Use: last week    Type of Substance: THC use    Frequency of Use: weekly    Request to See : NO    If yes, notified : NO    Guardian:NO    Guardian Contact:    Release of Information Signed: YES    Release of Information Signed For: Yoli Lora (mother): [phone]    Treatment Plan: pt reviewed and signed

## 2022-02-05 LAB — LEVETIRACETAM SERPL-MCNC: 4.6 UG/ML (ref 10–40)

## 2022-02-05 PROCEDURE — 74011250637 HC RX REV CODE- 250/637: Performed by: PSYCHIATRY & NEUROLOGY

## 2022-02-05 PROCEDURE — 65220000003 HC RM SEMIPRIVATE PSYCH

## 2022-02-05 RX ADMIN — ARIPIPRAZOLE 2 MG: 2 TABLET ORAL at 09:07

## 2022-02-05 RX ADMIN — ALPRAZOLAM 0.5 MG: 0.25 TABLET ORAL at 17:50

## 2022-02-05 RX ADMIN — SPIRONOLACTONE 100 MG: 25 TABLET ORAL at 09:10

## 2022-02-05 RX ADMIN — MELATONIN TAB 3 MG 3 MG: 3 TAB at 21:09

## 2022-02-05 RX ADMIN — LEVETIRACETAM 500 MG: 500 TABLET, FILM COATED ORAL at 21:08

## 2022-02-05 RX ADMIN — POTASSIUM CHLORIDE 20 MEQ: 1500 TABLET, EXTENDED RELEASE ORAL at 09:06

## 2022-02-05 RX ADMIN — LEVETIRACETAM 500 MG: 500 TABLET, FILM COATED ORAL at 09:07

## 2022-02-05 RX ADMIN — ALPRAZOLAM 0.5 MG: 0.25 TABLET ORAL at 21:09

## 2022-02-05 RX ADMIN — PANTOPRAZOLE SODIUM 40 MG: 40 TABLET, DELAYED RELEASE ORAL at 09:07

## 2022-02-05 RX ADMIN — ESCITALOPRAM OXALATE 20 MG: 10 TABLET ORAL at 09:10

## 2022-02-05 RX ADMIN — TOPIRAMATE 100 MG: 100 TABLET, FILM COATED ORAL at 09:00

## 2022-02-05 RX ADMIN — ALPRAZOLAM 0.5 MG: 0.25 TABLET ORAL at 09:07

## 2022-02-05 RX ADMIN — VENLAFAXINE HYDROCHLORIDE 37.5 MG: 37.5 CAPSULE, EXTENDED RELEASE ORAL at 09:06

## 2022-02-05 RX ADMIN — ALPRAZOLAM 0.5 MG: 0.25 TABLET ORAL at 13:34

## 2022-02-05 RX ADMIN — TOPIRAMATE 100 MG: 100 TABLET, FILM COATED ORAL at 21:09

## 2022-02-05 RX ADMIN — PANTOPRAZOLE SODIUM 40 MG: 40 TABLET, DELAYED RELEASE ORAL at 17:50

## 2022-02-05 RX ADMIN — METOPROLOL TARTRATE 12.5 MG: 25 TABLET, FILM COATED ORAL at 21:08

## 2022-02-05 RX ADMIN — ACETAMINOPHEN 650 MG: 325 TABLET ORAL at 10:49

## 2022-02-05 RX ADMIN — METOPROLOL TARTRATE 12.5 MG: 25 TABLET, FILM COATED ORAL at 09:07

## 2022-02-05 NOTE — BH NOTES
Client is pleasant and cooperative. Alert and oriented x 3. Appearance is neat and clean. Client has a good appetite and reports sleeping well. Denies any  thoughts to harm herself. Client given tylenol for complaint of headache. She will get mom to bring her birth control pills in so that she can get a order to start back taking them. Reports that her cycle has started back because she has not taken them for a few days. Takes meds as prescribed and denies any side effects. She is quiet and soft spoken. Remains on close observation for safety.

## 2022-02-05 NOTE — PROGRESS NOTES
Progress Note  Date:2022       Room:[facility]  Patient Name:Shawna Hdz     YOB: 1995     Age:26 y.o. Subjective    Subjective   Review of Systems  Objective         Vitals Last 24 Hours:  TEMPERATURE:  Temp  Av °F (36.7 °C)  Min: 97.9 °F (36.6 °C)  Max: 98.1 °F (36.7 °C)  RESPIRATIONS RANGE: Resp  Av  Min: 17  Max: 17  PULSE OXIMETRY RANGE: No data recorded  PULSE RANGE: Pulse  Av  Min: 64  Max: 76  BLOOD PRESSURE RANGE: Systolic (69UWP), QHE:86 , Min:83 , DDB:22   ; Diastolic (50RTE), HPV:52, Min:54, Max:63    I/O (24Hr): No intake or output data in the 24 hours ending 22 2205  Objective  Labs/Imaging/Diagnostics    Labs:  CBC:Recent Labs     22  2134   WBC 12.8*   RBC 4.99   HGB 15.5   HCT 45.8   MCV 91.8   RDW 12.4        CHEMISTRIES:  Recent Labs     22  2134      K 3.9   *   CO2 25   BUN 10   CA 9.0   PT/INR:No results for input(s): INR, INREXT in the last 72 hours. No lab exists for component: PROTIME  APTT:No results for input(s): APTT in the last 72 hours. LIVER PROFILE:No results for input(s): AST, ALT in the last 72 hours. No lab exists for component: BILIDIR, BILITOT, ALKPHOS  No results found for: ALT, AST, GGT, GGTP, AP, APIT, APX, CBIL, TBIL, TBILI    Imaging Last 24 Hours:  No results found.   Assessment//Plan   Active Problems:    Major depression (2/3/2022)      Suicide attempt ([address] (2/3/2022)      Suicidal ideations (2/3/2022)      Assessment & Plan patient case discussed with treatment team.  That led to the hospitalization background in her needs patient seen alert verbal polite still isolating herself in the room did not go much room but willing to go compliant with medication no side effects denies any suicidal thoughts at present still depression 10 anxiety 10 tolerating decreasing Xanax started on venlafaxine and Abilify reassess the case tomorrow vital signs are stable encouraged to attend all the groups    Electronically signed by Adrian Manzano MD on 2/4/2022 at 10:05 PM

## 2022-02-05 NOTE — BH NOTES
Pt. Withdrawn, remained in room in bed on this shift. Medication compliant. Denied depression, anxiety, SI/HI, hallucinations, pain. Pt. In no distress respirations regular and unlabored. Will continue to round closely Q15 mins per unit protocol.

## 2022-02-05 NOTE — PROGRESS NOTES
Progress Note  Date:2022       Room:[facility]  Patient Name:Shawna Doe     YOB: 1995     Age:26 y.o. Subjective    Subjective  Patient presents depressed anxious, tearful at times during conversation. She does state feeling lonely and depressed. Not having had any suicidal thoughts today. Hallucinations. Mental status examination-patient is alert oriented to name place person emotionally labile depressed anxious does report grooming. No evidence of any active psychosis Insight judgment and coping remains limited but continues to improve. Review of Systems  Objective         Vitals Last 24 Hours:  TEMPERATURE:  Temp  Av.1 °F (36.7 °C)  Min: 98.1 °F (36.7 °C)  Max: 98.1 °F (36.7 °C)  RESPIRATIONS RANGE: Resp  Av  Min: 17  Max: 17  PULSE OXIMETRY RANGE: No data recorded  PULSE RANGE: Pulse  Av  Min: 76  Max: 76  BLOOD PRESSURE RANGE: Systolic (99DKR), YQM:58 , Min:96 , IY   ; Diastolic (61NJO), OQM:41, Min:63, Max:63    I/O (24Hr): No intake or output data in the 24 hours ending 22 1140  Objective  Labs/Imaging/Diagnostics    Labs:  CBC:Recent Labs     22  2134   WBC 12.8*   RBC 4.99   HGB 15.5   HCT 45.8   MCV 91.8   RDW 12.4        CHEMISTRIES:  Recent Labs     22  2134      K 3.9   *   CO2 25   BUN 10   CA 9.0   PT/INR:No results for input(s): INR, INREXT in the last 72 hours. No lab exists for component: PROTIME  APTT:No results for input(s): APTT in the last 72 hours. LIVER PROFILE:No results for input(s): AST, ALT in the last 72 hours. No lab exists for component: BILIDIR, BILITOT, ALKPHOS  No results found for: ALT, AST, GGT, GGTP, AP, APIT, APX, CBIL, TBIL, TBILI    Imaging Last 24 Hours:  No results found.   Assessment//Plan   Active Problems:    Major depression (2/3/2022)      Suicide attempt ([address] (2/3/2022)      Suicidal ideations (2/3/2022)      Assessment & Plan  Continue current medications  Provided support next encourage therapy  Continue to assess for further titration of Abilify      Current Facility-Administered Medications:     venlafaxine-SR (EFFEXOR-XR) capsule 37.5 mg, 37.5 mg, Oral, DAILY WITH BREAKFAST, Afia López MD, 37.5 mg at 02/05/22 0906    OLANZapine (ZyPREXA) tablet 5 mg, 5 mg, Oral, Q6H PRN, Harsh Vaughan MD    LORazepam (ATIVAN) injection 1 mg, 1 mg, IntraMUSCular, Q4H PRN, Ivonne Vaughan MD    hydrOXYzine HCL (ATARAX) tablet 50 mg, 50 mg, Oral, TID PRN, Afia López MD    traZODone (DESYREL) tablet 50 mg, 50 mg, Oral, QHS PRN, Afia López MD    acetaminophen (TYLENOL) tablet 650 mg, 650 mg, Oral, Q4H PRN, Afia López MD, 650 mg at 02/05/22 1049    magnesium hydroxide (MILK OF MAGNESIA) 400 mg/5 mL oral suspension 30 mL, 30 mL, Oral, DAILY PRN, Afia López MD    levETIRAcetam (KEPPRA) tablet 500 mg, 500 mg, Oral, BID, Afia López MD, 500 mg at 02/05/22 0907    topiramate (TOPAMAX) tablet 100 mg, 100 mg, Oral, BID, Afia López MD, 100 mg at 02/04/22 2102    ALPRAZolam (XANAX) tablet 0.5 mg, 0.5 mg, Oral, QID, Afia López MD, 0.5 mg at 02/05/22 0907    escitalopram oxalate (LEXAPRO) tablet 20 mg, 20 mg, Oral, DAILY, Afia López MD, 20 mg at 02/05/22 0910    ARIPiprazole (ABILIFY) tablet 2 mg, 2 mg, Oral, DAILY, Afia López MD, 2 mg at 02/05/22 0907    potassium chloride (K-DUR, KLOR-CON M20) SR tablet 20 mEq, 20 mEq, Oral, DAILY, Afia López MD, 20 mEq at 02/05/22 0906    spironolactone (ALDACTONE) tablet 100 mg, 100 mg, Oral, DAILY, Afia López MD, 100 mg at 02/05/22 0910    metoprolol tartrate (LOPRESSOR) tablet 12.5 mg, 12.5 mg, Oral, BID, Afia López MD, 12.5 mg at 02/05/22 0907    melatonin tablet 3 mg, 3 mg, Oral, QHS, Afia López MD, 3 mg at 02/04/22 2102    pantoprazole (PROTONIX) tablet 40 mg, 40 mg, Oral, ACB&D, Afia López MD, 40 mg at 02/05/22 3528  Electronically signed by Jose Woody MD on 2/5/2022 at 11:40 AM

## 2022-02-05 NOTE — GROUP NOTE
IP  GROUP DOCUMENTATION INDIVIDUAL                                                                          Group Therapy Note    Date: 2/5/2022    Group Start Time: 1148  Group End Time: 1575  Group Topic: Recreational/Music Therapy    SRM 2  NON ACUTE    Citlali Lundberg    IP 1150 [facility] GROUP DOCUMENTATION GROUP    Group Therapy Note    Attendees: 9/12  Facilitated structured group to introduce healthy leisure task skill as positive way to cope and manage stress. Attendance: Attended    Patient's Goal:  STG: Attends activities and groups        Interventions/techniques: Art integration and Supported    Follows Directions: Followed directions    Interactions: Interacted appropriately    Mental Status: Calm and Flat    Behavior/appearance: Attentive    Goals Achieved: Able to engage in interactions and Able to listen to others    Additional Notes: Attended group and actively participated. Offered leisure packet. Worked on task in group and  listened to music. Was receptive to intervention and used time constructively.     Adis Ferrell

## 2022-02-06 PROCEDURE — 74011250637 HC RX REV CODE- 250/637: Performed by: PSYCHIATRY & NEUROLOGY

## 2022-02-06 PROCEDURE — 65220000003 HC RM SEMIPRIVATE PSYCH

## 2022-02-06 RX ORDER — ESTRADIOL 1 MG/1
1 TABLET ORAL DAILY
Status: DISCONTINUED | OUTPATIENT
Start: 2022-02-06 | End: 2022-02-06

## 2022-02-06 RX ADMIN — ESCITALOPRAM OXALATE 20 MG: 10 TABLET ORAL at 08:53

## 2022-02-06 RX ADMIN — SPIRONOLACTONE 100 MG: 25 TABLET ORAL at 08:53

## 2022-02-06 RX ADMIN — ESTRADIOL 1 MG: 1 TABLET ORAL at 12:00

## 2022-02-06 RX ADMIN — ALPRAZOLAM 0.5 MG: 0.25 TABLET ORAL at 08:53

## 2022-02-06 RX ADMIN — LEVETIRACETAM 500 MG: 500 TABLET, FILM COATED ORAL at 08:53

## 2022-02-06 RX ADMIN — ARIPIPRAZOLE 2 MG: 2 TABLET ORAL at 08:53

## 2022-02-06 RX ADMIN — ALPRAZOLAM 0.5 MG: 0.25 TABLET ORAL at 21:04

## 2022-02-06 RX ADMIN — VENLAFAXINE HYDROCHLORIDE 37.5 MG: 37.5 CAPSULE, EXTENDED RELEASE ORAL at 08:53

## 2022-02-06 RX ADMIN — PANTOPRAZOLE SODIUM 40 MG: 40 TABLET, DELAYED RELEASE ORAL at 17:32

## 2022-02-06 RX ADMIN — METOPROLOL TARTRATE 12.5 MG: 25 TABLET, FILM COATED ORAL at 21:04

## 2022-02-06 RX ADMIN — ALPRAZOLAM 0.5 MG: 0.25 TABLET ORAL at 17:32

## 2022-02-06 RX ADMIN — ALPRAZOLAM 0.5 MG: 0.25 TABLET ORAL at 12:21

## 2022-02-06 RX ADMIN — TOPIRAMATE 100 MG: 100 TABLET, FILM COATED ORAL at 21:04

## 2022-02-06 RX ADMIN — ACETAMINOPHEN 650 MG: 325 TABLET ORAL at 09:53

## 2022-02-06 RX ADMIN — POTASSIUM CHLORIDE 20 MEQ: 1500 TABLET, EXTENDED RELEASE ORAL at 08:53

## 2022-02-06 RX ADMIN — LEVETIRACETAM 500 MG: 500 TABLET, FILM COATED ORAL at 21:04

## 2022-02-06 RX ADMIN — TOPIRAMATE 100 MG: 100 TABLET, FILM COATED ORAL at 08:53

## 2022-02-06 RX ADMIN — MELATONIN TAB 3 MG 3 MG: 3 TAB at 21:04

## 2022-02-06 RX ADMIN — METOPROLOL TARTRATE 12.5 MG: 25 TABLET, FILM COATED ORAL at 08:53

## 2022-02-06 RX ADMIN — PANTOPRAZOLE SODIUM 40 MG: 40 TABLET, DELAYED RELEASE ORAL at 08:53

## 2022-02-06 NOTE — BH NOTES
Client is pleasant and cooperative. Alert and oriented x 3. Appearance is semi-tidy. Takes meds as prescribed and denies any side effects. Client was given tylenol for headache and client reports headache was relieved by medication. She has interacted well with peers. Denies feeling suicidal or homicidal.Still admits to feeling sad at times. Denies any thoughts to harm herself. Remains on close observation for safety.

## 2022-02-06 NOTE — BH NOTES
Pt. Remained quiet and withdrawn in her room on this shift. Slow to speak and respond to this writers questions although pleasant and cooperative. Denies depression, anxiety, SI/HI, hallucinations or pain. Pt medication compliant in no distress respirations regular and unlabored. Will continue to round closely Q15 mins per unit protocol for safety.

## 2022-02-06 NOTE — BH NOTES
Behavioral Health Treatment Team Note     Patient goal(s) for today: 'stay calm'  Treatment team focus/goals: Attend group and continue medication management     Progress note: The patient presented in the day room and appeared to have done some grooming. She denied SI/HI/AVH at this time and was oriented x4, she described her mood as, \"good, excited to get out\", and presented a flat mood with a congruent affect. She maintained good eye contact and informed the writer that she had been in contact with her mother while she has been in here. An inpatient level of care is needed at this time for mood stabilization and medication management.      LOS:  3  Expected LOS: 5-7 Days     Insurance info/prescription coverage: [employer]   Date of last family contact:  2/6/22 with mom  Family requesting physician contact today:  no  Discharge plan: No longer a danger to herself and medication management   Guns in the home:  no   Outpatient provider(s): Dr. Rolando Rossi     Participating treatment team members: Mauro Amezcua,   Assigned Therapist Wilma Palmer, Greg HebertLens

## 2022-02-06 NOTE — PROGRESS NOTES
Progress Note  Date:2022       Room:[facility]  Patient Name:Shawna Bee     YOB: 1995     Age:26 y.o. Subjective    Subjective patient reports she is feeling better and she has been coming out to the groups. She denies having any active suicidal thoughts currently. She reports her depression and anxiety has been slightly better. No side effects voiced  Mental status examination-patient is alert oriented to name place person no SI HI voiced today. No evidence of any active psychosis. Review of Systems  Objective         Vitals Last 24 Hours:  TEMPERATURE:  Temp  Av.7 °F (36.5 °C)  Min: 97.6 °F (36.4 °C)  Max: 97.8 °F (36.6 °C)  RESPIRATIONS RANGE: Resp  Av  Min: 15  Max: 18  PULSE OXIMETRY RANGE: No data recorded  PULSE RANGE: Pulse  Av  Min: 66  Max: 90  BLOOD PRESSURE RANGE: Systolic (54PBE), YMW:650 , Min:95 , QLS:139   ; Diastolic (81SXD), JDK:55, Min:64, Max:69    I/O (24Hr): No intake or output data in the 24 hours ending 22 1115  Objective  Labs/Imaging/Diagnostics    Labs:  CBC:No results for input(s): WBC, RBC, HGB, HCT, MCV, RDW, PLT, HGBEXT, HCTEXT, PLTEXT in the last 72 hours. CHEMISTRIES:No results for input(s): NA, K, CL, CO2, BUN, CA, PHOS, MG in the last 72 hours. No lab exists for component: CREATININE, GLUCOSEPT/INR:No results for input(s): INR, INREXT in the last 72 hours. No lab exists for component: PROTIME  APTT:No results for input(s): APTT in the last 72 hours. LIVER PROFILE:No results for input(s): AST, ALT in the last 72 hours. No lab exists for component: BILIDIR, BILITOT, ALKPHOS  No results found for: ALT, AST, GGT, GGTP, AP, APIT, APX, CBIL, TBIL, TBILI    Imaging Last 24 Hours:  No results found.   Assessment//Plan   Active Problems:    Major depression (2/3/2022)      Suicide attempt ([facility] [address] (2/3/2022)      Suicidal ideations (2/3/2022)      Assessment & Plan  Continue current medications and therapy  No side effects voiced    Current Facility-Administered Medications:     estradioL (ESTRACE) tablet 1 mg, 1 mg, Oral, DAILY, Karina Trotter MD    OTHER(NON-FORMULARY) 2 mg/day, 2 mg/day, Oral, DAILY, Karina Trotter MD    [facility]ine[facility] capsule 37.5 mg, 37.5 mg, Oral, DAILY WITH BREAKFAST, Yolis Mejia MD, 37.5 mg at 02/06/22 0853    OLANZapine (ZyPREXA) tablet 5 mg, 5 mg, Oral, Q6H PRN, Harsh Vaughan MD    LORazepam (ATIVAN) injection 1 mg, 1 mg, IntraMUSCular, Q4H PRN, Yolis Mejia MD    hydrOXYzine HCL (ATARAX) tablet 50 mg, 50 mg, Oral, TID PRN, Yolis Mejia MD    traZODone (DESYREL) tablet 50 mg, 50 mg, Oral, QHS PRN, Yolis Mejia MD    acetaminophen (TYLENOL) tablet 650 mg, 650 mg, Oral, Q4H PRN, Yolis Mejia MD, 650 mg at 02/06/22 0953    magnesium hydroxide (MILK OF MAGNESIA) 400 mg/5 mL oral suspension 30 mL, 30 mL, Oral, DAILY PRN, Harsh Vaughan MD    levETIRAcetam (KEPPRA) tablet 500 mg, 500 mg, Oral, BID, Yolis Mejia MD, 500 mg at 02/06/22 0853    topiramate (TOPAMAX) tablet 100 mg, 100 mg, Oral, BID, Yolis Mejia MD, 100 mg at 02/06/22 0853    ALPRAZolam (XANAX) tablet 0.5 mg, 0.5 mg, Oral, QID, Yolis Mejia MD, 0.5 mg at 02/06/22 0853    escitalopram oxalate (LEXAPRO) tablet 20 mg, 20 mg, Oral, DAILY, Kika BEATTY MD, 20 mg at 02/06/22 0853    ARIPiprazole (ABILIFY) tablet 2 mg, 2 mg, Oral, DAILY, Yolis Mejia MD, 2 mg at 02/06/22 0853    potassium chloride (K-DUR, KLOR-CON M20) SR tablet 20 mEq, 20 mEq, Oral, DAILY, Harsh Vaughan MD, 20 mEq at 02/06/22 0853    spironolactone (ALDACTONE) tablet 100 mg, 100 mg, Oral, DAILY, Kika BEATTY MD, 100 mg at 02/06/22 0853    metoprolol tartrate (LOPRESSOR) tablet 12.5 mg, 12.5 mg, Oral, BID, Kika BEATTY MD, 12.5 mg at 02/06/22 0853    melatonin tablet 3 mg, 3 mg, Oral, QHS, Harsh Vaughan MD, 3 mg at 02/05/22 2109    pantoprazole (PROTONIX) tablet 40 mg, 40 mg, Oral, ACB&D, Santhosh Chaudhry MD, 40 mg at 02/06/22 1982  Electronically signed by Jennie Asif MD on 2/6/2022 at 11:15 AM

## 2022-02-07 VITALS
OXYGEN SATURATION: 99 % | DIASTOLIC BLOOD PRESSURE: 81 MMHG | HEIGHT: 65 IN | RESPIRATION RATE: 18 BRPM | WEIGHT: 114.64 LBS | SYSTOLIC BLOOD PRESSURE: 114 MMHG | TEMPERATURE: 98.1 F | HEART RATE: 68 BPM | BODY MASS INDEX: 19.1 KG/M2

## 2022-02-07 PROCEDURE — 74011250637 HC RX REV CODE- 250/637: Performed by: PSYCHIATRY & NEUROLOGY

## 2022-02-07 RX ORDER — SPIRONOLACTONE 100 MG/1
100 TABLET, FILM COATED ORAL DAILY
Qty: 30 TABLET | Refills: 0 | Status: SHIPPED | OUTPATIENT
Start: 2022-02-08

## 2022-02-07 RX ORDER — ALPRAZOLAM 0.5 MG/1
0.5 TABLET ORAL
Qty: 90 TABLET | Refills: 0 | Status: SHIPPED | OUTPATIENT
Start: 2022-02-07

## 2022-02-07 RX ORDER — LANOLIN ALCOHOL/MO/W.PET/CERES
3 CREAM (GRAM) TOPICAL
Qty: 30 TABLET | Refills: 0 | Status: SHIPPED | OUTPATIENT
Start: 2022-02-07

## 2022-02-07 RX ORDER — ARIPIPRAZOLE 2 MG/1
2 TABLET ORAL DAILY
Qty: 30 TABLET | Refills: 0 | Status: SHIPPED | OUTPATIENT
Start: 2022-02-08

## 2022-02-07 RX ORDER — TOPIRAMATE 100 MG/1
100 TABLET, FILM COATED ORAL 2 TIMES DAILY
Qty: 60 TABLET | Refills: 0 | Status: SHIPPED | OUTPATIENT
Start: 2022-02-07

## 2022-02-07 RX ORDER — PANTOPRAZOLE SODIUM 40 MG/1
40 TABLET, DELAYED RELEASE ORAL
Qty: 30 TABLET | Refills: 0 | Status: SHIPPED | OUTPATIENT
Start: 2022-02-07

## 2022-02-07 RX ORDER — HYDROXYZINE 50 MG/1
25 TABLET, FILM COATED ORAL
Qty: 90 TABLET | Refills: 0 | Status: SHIPPED | OUTPATIENT
Start: 2022-02-07 | End: 2022-02-17

## 2022-02-07 RX ORDER — TRAZODONE HYDROCHLORIDE 50 MG/1
50 TABLET ORAL
Qty: 30 TABLET | Refills: 0 | Status: SHIPPED | OUTPATIENT
Start: 2022-02-07

## 2022-02-07 RX ORDER — METOPROLOL TARTRATE 25 MG/1
12.5 TABLET, FILM COATED ORAL 2 TIMES DAILY
Qty: 60 TABLET | Refills: 0 | Status: SHIPPED | OUTPATIENT
Start: 2022-02-07

## 2022-02-07 RX ORDER — NORTRIPTYLINE HYDROCHLORIDE 75 MG/1
75 CAPSULE ORAL
Qty: 30 CAPSULE | Refills: 0 | Status: SHIPPED | OUTPATIENT
Start: 2022-02-07

## 2022-02-07 RX ORDER — ESCITALOPRAM OXALATE 20 MG/1
20 TABLET ORAL DAILY
Qty: 30 TABLET | Refills: 0 | Status: SHIPPED | OUTPATIENT
Start: 2022-02-08

## 2022-02-07 RX ORDER — POTASSIUM CHLORIDE 20 MEQ/1
20 TABLET, EXTENDED RELEASE ORAL DAILY
Qty: 30 TABLET | Refills: 0 | Status: SHIPPED | OUTPATIENT
Start: 2022-02-08

## 2022-02-07 RX ORDER — LEVETIRACETAM 500 MG/1
500 TABLET ORAL 2 TIMES DAILY
Qty: 60 TABLET | Refills: 0 | Status: SHIPPED | OUTPATIENT
Start: 2022-02-07

## 2022-02-07 RX ORDER — VENLAFAXINE HYDROCHLORIDE 37.5 MG/1
37.5 CAPSULE, EXTENDED RELEASE ORAL
Qty: 30 CAPSULE | Refills: 0 | Status: SHIPPED | OUTPATIENT
Start: 2022-02-08

## 2022-02-07 RX ADMIN — SPIRONOLACTONE 100 MG: 25 TABLET ORAL at 09:18

## 2022-02-07 RX ADMIN — ESCITALOPRAM OXALATE 20 MG: 10 TABLET ORAL at 08:09

## 2022-02-07 RX ADMIN — LEVETIRACETAM 500 MG: 500 TABLET, FILM COATED ORAL at 08:09

## 2022-02-07 RX ADMIN — ALPRAZOLAM 0.5 MG: 0.25 TABLET ORAL at 08:09

## 2022-02-07 RX ADMIN — VENLAFAXINE HYDROCHLORIDE 37.5 MG: 37.5 CAPSULE, EXTENDED RELEASE ORAL at 08:09

## 2022-02-07 RX ADMIN — ALPRAZOLAM 0.5 MG: 0.25 TABLET ORAL at 12:23

## 2022-02-07 RX ADMIN — TOPIRAMATE 100 MG: 100 TABLET, FILM COATED ORAL at 08:09

## 2022-02-07 RX ADMIN — METOPROLOL TARTRATE 12.5 MG: 25 TABLET, FILM COATED ORAL at 09:18

## 2022-02-07 RX ADMIN — ARIPIPRAZOLE 2 MG: 2 TABLET ORAL at 08:09

## 2022-02-07 RX ADMIN — PANTOPRAZOLE SODIUM 40 MG: 40 TABLET, DELAYED RELEASE ORAL at 08:08

## 2022-02-07 RX ADMIN — POTASSIUM CHLORIDE 20 MEQ: 1500 TABLET, EXTENDED RELEASE ORAL at 09:18

## 2022-02-07 NOTE — GROUP NOTE
MAYKEL  GROUP DOCUMENTATION INDIVIDUAL                                                                          Group Therapy Note    Date: 2/7/2022    Group Start Time: 0935  Group End Time: 1010  Group Topic: [employer] Meeting    SRM 2 BH NON ACUTE    Mandy Cooper    IP [address] [facility] GROUP DOCUMENTATION GROUP    Group Therapy Note    Attendees: 7         Attendance: Did not attend    Patient's Goal:      Interventions/techniques: Follows Directions:     Interactions:     Mental Status:     Behavior/appearance:     Goals Achieved: Additional Notes:  Pt was encouraged to attend group but Pt chose not to attend.     [facility]

## 2022-02-07 NOTE — GROUP NOTE
IP  GROUP DOCUMENTATION INDIVIDUAL                                                                          Group Therapy Note    Date: 2/7/2022    Group Start Time: 8450  Group End Time: 5764  Group Topic: Recreational/Music Therapy    SRM 2  NON ACUTE    Miles Kennedy    IP [address] [facility] GROUP DOCUMENTATION GROUP    Group Therapy Note    Facilitated leisure skills group to reinforce positive coping through music, social interaction, group activities and arts/crafts    Attendees: 11/13         Attendance: Attended    Patient's Goal:  Attend group daily     Interventions/techniques: Art integration and Supported    Follows Directions: Followed directions    Interactions: Interacted appropriately    Mental Status: Calm    Behavior/appearance: Cooperative    Goals Achieved: Able to engage in interactions and Able to listen to others      Additional Notes:  Came to group late. Receptive to listening to music. Decline to work on leisure task. Interacted when prompted.  Pt stated Chaparro Calhoun was being discharged and was waiting on her transportation\"    Clovis Bojorquez

## 2022-02-07 NOTE — GROUP NOTE
MAYKEL  GROUP DOCUMENTATION INDIVIDUAL                                                                          Group Therapy Note    Date: 2/7/2022    Group Start Time: 0874  Group End Time: 1400  Group Topic: Process Group - Inpatient    SRM [address] GROUP DOCUMENTATION GROUP    Group Therapy Note: Facilitator provided topics on the board for each member to select and talk about for 1 minute allowing time for peers to offer support vs. advice. Facilitator modeled supportive behavior and provided a stress ball for each speaker to hold to assist with tolerating anxiety/distress. Attendees: 8         Attendance: Attended    Patient's Goal:  To attend groups    Interventions/techniques: Informed, Validated, Reinforced, Role playing and Supported    Follows Directions: Followed directions    Interactions: Interacted appropriately    Mental Status: Calm and Congruent    Behavior/appearance: Attentive and Motivated    Goals Achieved: Able to engage in interactions, Able to listen to others, Able to give feedback to another, Able to reflect/comment on own behavior, Able to self-disclose and Identified feelings      Additional Notes:  Pt openly shared on the topic \"Mental Health Conditions\" and was receptive to support from peers.      Brant Mcneill

## 2022-02-07 NOTE — BH NOTES
DISCHARGE SUMMARY    NAME:Shawna Cerna  : 1995  MRN: [id number]    The patient Maria Elena Rick exhibits the ability to control behavior in a less restrictive environment. Patient's level of functioning is improving. No assaultive/destructive behavior has been observed for the past 24 hours. No suicidal/homicidal threat or behavior has been observed for the past 24 hours. There is no evidence of serious medication side effects. Patient has not been in physical or protective restraints for at least the past 24 hours. If weapons involved, how are they secured? n/a    Is patient aware of and in agreement with discharge plan? yes    Arrangements for medication:  Prescriptions given to patient, given a weeks supply or 30 day supply. Copy of discharge instructions to provider?:  yes    Arrangements for transportation home:  Pt will be picked up by mother at 3p    Keep all follow up appointments as scheduled, continue to take prescribed medications per physician instructions.   Mental health crisis number:  049 or your local mental health crisis line number at [place] [address] Emergency WARM LINE      1-183-575-MHAV ([phone])      M-F: 9am to 9pm      Sat & Sun: 5pm - 9pm  National suicide prevention lines:                             3-392-ILBTJGT (9-985-768-255-673-1803)       4-653-270-TALK ([phone])    Crisis Text Line:  Text HOME to 199823

## 2022-02-07 NOTE — BH NOTES
Patient discharged to home accompanied by her mother. Belongings sent with patient. Verbal/written discharge instructions explained & given to patient, including prescriptions & follow up care. Patient verbalized understanding. Calm & cooperative. Denies SI/HI. Denies AVH. No physical complaints voiced. Positive attitude toward discharge.

## 2022-02-07 NOTE — BH NOTES
Pt. Was social with peers on this shift, stated she had a good day and feels better then yesterday. Pt. Is medication compliant, denies anxiety, depression, SI/HI, hallucinations, pain. Pt. Talked with her brother. Pt. In no distress respirations regular and unlabored. Will continue to monitor closely Q15 mins per unit protocol.

## 2022-02-07 NOTE — BH NOTES
Behavioral Health Transition Record to Provider    Patient Name: Asuncion Graves  YOB: 1995  Medical Record Number: [id number]  Date of Admission: 2/2/2022  Date of Discharge: 2.7.22    Attending Provider: Jacob Rodriguez MD  Discharging Provider: Dr Chrystal Camacho  To contact this individual call [phone] and ask the  to page. If unavailable, ask to be transferred to [facility] Provider on call. [facility] Provider will be available on call 24/7 and during holidays. Primary Care Provider: Maria G Fong MD    Allergies   Allergen Reactions    Contrast Agent [Iodine] Unknown (comments)       Reason for Admission: pt was admitted for suicide attempt of overdose. Pt reported that she feels like a let down due to her having a traumatic brain injury.     Admission Diagnosis: Major depression [F32.9]  Suicide attempt ([facility] [address] [T14.91XA]  Suicidal ideations [R45.851]    * No surgery found *    Results for orders placed or performed during the hospital encounter of 02/02/22   COVID-19 WITH INFLUENZA A/B   Result Value Ref Range    SARS-CoV-2 Not Detected Not Detected      Influenza A by PCR Not Detected Not Detected      Influenza B by PCR Not Detected Not Detected     HCG URINE, QL   Result Value Ref Range    HCG urine, QL Negative Negative     URINALYSIS W/ RFLX MICROSCOPIC   Result Value Ref Range    Color Yellow/Straw      Appearance Clear Clear      Specific gravity 1.005 1.003 - 1.030      pH (UA) 6.0 5.0 - 8.0      Protein Negative Negative mg/dL    Glucose Negative Negative mg/dL    Ketone Negative Negative mg/dL    Bilirubin Negative Negative      Blood Negative Negative      Urobilinogen 0.1 0.1 - 1.0 EU/dL    Nitrites Negative Negative      Leukocyte Esterase Negative Negative      WBC 5-10 0 - 4 /hpf    RBC 0-5 0 - 5 /hpf    Bacteria 1+ (A) Negative /hpf   DRUG SCREEN, URINE   Result Value Ref Range    AMPHETAMINES Negative Negative      BARBITURATES Negative Negative BENZODIAZEPINES Positive (A) Negative      COCAINE Negative Negative      METHADONE Negative Negative      OPIATES Negative Negative      PCP(PHENCYCLIDINE) Negative Negative      THC (TH-CANNABINOL) Positive (A) Negative      Drug screen comment        This test is a screen for drugs of abuse in a medical setting only (i.e., they are unconfirmed results and as such must not be used for non-medical purposes, e.g.,employment testing, legal testing). Due to its inherent nature, false positive (FP) and false negative (FN) results may be obtained. Therefore, if necessary for medical care, recommend confirmation of positive findings by GC/MS. METABOLIC PANEL, BASIC   Result Value Ref Range    Sodium 137 136 - 145 mmol/L    Potassium 3.9 3.5 - 5.1 mmol/L    Chloride 110 (H) 97 - 108 mmol/L    CO2 25 21 - 32 mmol/L    Anion gap 2 (L) 5 - 15 mmol/L    Glucose 80 65 - 100 mg/dL    BUN 10 6 - 20 mg/dL    Creatinine 0.97 0.55 - 1.02 mg/dL    BUN/Creatinine ratio 10 (L) 12 - 20      GFR est AA >60 >60 ml/min/1.73m2    GFR est non-AA >60 >60 ml/min/1.73m2    Calcium 9.0 8.5 - 10.1 mg/dL   CBC WITH AUTOMATED DIFF   Result Value Ref Range    WBC 12.8 (H) 3.6 - 11.0 K/uL    RBC 4.99 3.80 - 5.20 M/uL    HGB 15.5 11.5 - 16.0 g/dL    HCT 45.8 35.0 - 47.0 %    MCV 91.8 80.0 - 99.0 FL    MCH 31.1 26.0 - 34.0 PG    MCHC 33.8 30.0 - 36.5 g/dL    RDW 12.4 11.5 - 14.5 %    PLATELET 153 274 - 134 K/uL    MPV 10.1 8.9 - 12.9 FL    NRBC 0.0 0.0  WBC    ABSOLUTE NRBC 0.00 0.00 - 0.01 K/uL    NEUTROPHILS 75 32 - 75 %    LYMPHOCYTES 19 12 - 49 %    MONOCYTES 5 5 - 13 %    EOSINOPHILS 1 0 - 7 %    BASOPHILS 0 0 - 1 %    IMMATURE GRANULOCYTES 0 0 - 0.5 %    ABS. NEUTROPHILS 9.6 (H) 1.8 - 8.0 K/UL    ABS. LYMPHOCYTES 2.5 0.8 - 3.5 K/UL    ABS. MONOCYTES 0.6 0.0 - 1.0 K/UL    ABS. EOSINOPHILS 0.1 0.0 - 0.4 K/UL    ABS. BASOPHILS 0.0 0.0 - 0.1 K/UL    ABS. IMM.  GRANS. 0.0 0.00 - 0.04 K/UL    DF AUTOMATED     SALICYLATE   Result Value Ref Range    Salicylate level <7.0 (L) 2.8 - 20.0 mg/dL   ACETAMINOPHEN   Result Value Ref Range    Acetaminophen level <10 (L) 10 - 30 ug/mL   LEVETIRACETAM (KEPPRA)   Result Value Ref Range    Levetiracetam (Keppra) 4.6 (L) 10.0 - 40.0 ug/mL   EKG, 12 LEAD, INITIAL   Result Value Ref Range    Ventricular Rate 79 BPM    Atrial Rate 79 BPM    P-R Interval 120 ms    QRS Duration 90 ms    Q-T Interval 448 ms    QTC Calculation (Bezet) 513 ms    Calculated P Axis 67 degrees    Calculated R Axis 74 degrees    Calculated T Axis 48 degrees    Diagnosis       Normal sinus rhythm  Prolonged QT  Abnormal ECG  When compared with ECG of 19-DEC-2014 19:33,  No significant change was found  Confirmed by Bhavik Amor ([id number]) on 2/3/2022 10:07:45 AM         Immunizations administered during this encounter: There is no immunization history on file for this patient. Screening for Metabolic Disorders for Patients on Antipsychotic Medications  (Data obtained from the EMR)    Estimated Body Mass Index  Estimated body mass index is 19.08 kg/m² as calculated from the following:    Height as of this encounter: 5' 5\" (1.651 m). Weight as of this encounter: 52 kg (114 lb 10.2 oz).      Vital Signs/Blood Pressure  Visit Vitals  /81   Pulse 68   Temp 98.1 °F (36.7 °C)   Resp 18   Ht 5' 5\" (1.651 m)   Wt 52 kg (114 lb 10.2 oz)   SpO2 99%   BMI 19.08 kg/m²       Blood Glucose/Hemoglobin A1c  Lab Results   Component Value Date/Time    Glucose 80 02/02/2022 09:34 PM       No results found for: HBA1C, [id number]     Lipid Panel  No results found for: CHOL, CHOLX, CHLST, CHOLV, 352502, HDL, HDLP, LDL, LDLC, DLDLP, TGLX, TRIGL, TRIGP, CHHD, CHHDX     Discharge Diagnosis: Major depression [F32.9]  Suicide attempt ([address] [place]  Suicidal ideations [R45.481]      Discharge Plan: pt is going to parents home and will follow up with partial hospitalization program    Discharge Medication List and Instructions:   Current Discharge Medication List      START taking these medications    Details   hydrOXYzine HCL (ATARAX) 50 mg tablet Take 0.5 Tablets by mouth three (3) times daily as needed for Anxiety or Agitation for up to 10 days. Qty: 90 Tablet, Refills: 0  Start date: 2/7/2022, End date: 2/17/2022      traZODone (DESYREL) 50 mg tablet Take 1 Tablet by mouth nightly as needed for Sleep (For insomnia). Qty: 30 Tablet, Refills: 0  Start date: 2/7/2022      levETIRAcetam (KEPPRA) 500 mg tablet Take 1 Tablet by mouth two (2) times a day. Indications: additional medication for tonic-clonic epilepsy  Qty: 60 Tablet, Refills: 0  Start date: 2/7/2022      ARIPiprazole (ABILIFY) 2 mg tablet Take 1 Tablet by mouth daily. Indications: additional treatment for major depressive disorder  Qty: 30 Tablet, Refills: 0  Start date: 2/8/2022      potassium chloride (K-DUR, KLOR-CON M20) 20 mEq tablet Take 1 Tablet by mouth daily. Qty: 30 Tablet, Refills: 0  Start date: 2/8/2022      melatonin 3 mg tablet Take 1 Tablet by mouth nightly. Indications: a sleep disorder  Qty: 30 Tablet, Refills: 0  Start date: 2/7/2022      pantoprazole (PROTONIX) 40 mg tablet Take 1 Tablet by mouth Before breakfast and dinner. Qty: 30 Tablet, Refills: 0  Start date: 2/7/2022      venlafaxine-SR [facility] 37.5 mg capsule Take 1 Capsule by mouth daily (with breakfast). Qty: 30 Capsule, Refills: 0  Start date: 2/8/2022         CONTINUE these medications which have CHANGED    Details   ALPRAZolam (XANAX) 0.5 mg tablet Take 1 Tablet by mouth three (3) times daily as needed for Anxiety. Max Daily Amount: 1.5 mg. Indications: repeated episodes of anxiety  Qty: 90 Tablet, Refills: 0  Start date: 2/7/2022    Associated Diagnoses: Current moderate episode of major depressive disorder, unspecified whether recurrent (HCC)      escitalopram oxalate (LEXAPRO) 20 mg tablet Take 1 Tablet by mouth daily.  Indications: major depressive disorder  Qty: 30 Tablet, Refills: 0  Start date: 2/8/2022 metoprolol tartrate (LOPRESSOR) 25 mg tablet Take 0.5 Tablets by mouth two (2) times a day. Indications: rapid ventricular heartbeat  Qty: 60 Tablet, Refills: 0  Start date: 2/7/2022      topiramate (TOPAMAX) 100 mg tablet Take 1 Tablet by mouth two (2) times a day. Indications: migraine prevention  Qty: 60 Tablet, Refills: 0  Start date: 2/7/2022      spironolactone (ALDACTONE) 100 mg tablet Take 1 Tablet by mouth daily. Indications: high blood pressure  Qty: 30 Tablet, Refills: 0  Start date: 2/8/2022      nortriptyline (PAMELOR) 75 mg capsule Take 1 Capsule by mouth nightly.  Indications: depression  Qty: 30 Capsule, Refills: 0  Start date: 2/7/2022         STOP taking these medications       omeprazole (PRILOSEC) 40 mg capsule Comments:   Reason for Stopping:         busPIRone (BUSPAR) 30 mg tablet Comments:   Reason for Stopping:         cariprazine (Vraylar) 4.5 mg capsule Comments:   Reason for Stopping:         levETIRAcetam (keppra xr) 500 mg ER tablet Comments:   Reason for Stopping:         POTASSIUM CARBONATE Comments:   Reason for Stopping:         benzonatate (TESSALON) 100 mg capsule Comments:   Reason for Stopping:         fremanezumab-vfrm (Ajovy Syringe) 225 mg/1.5 mL syrg injection Comments:   Reason for Stopping:         linaCLOtide (Linzess) 290 mcg cap capsule Comments:   Reason for Stopping:         sucralfate (CARAFATE PO) Comments:   Reason for Stopping:         ondansetron (ZOFRAN ODT) 4 mg disintegrating tablet Comments:   Reason for Stopping:         ubrogepant [place]) 100 mg tablet Comments:   Reason for Stopping:         CETIRIZINE HCL (ZYRTEC PO) Comments:   Reason for Stopping:               Unresulted Labs (24h ago, onward)            None        To obtain results of studies pending at discharge, please contact [phone]    Follow-up Information     Follow up With Specialties Details Why Contact Info    Dr Flora Garcia on 2/14/2022 for neuropsych appt Jude Mcdonnell [address] on 2/14/2022 intake date for [facility] [phone]  [address] [facility]   Schedule an appointment as soon as possible for a visit in 1 day for medication management services [address], [address]    [facility] [employer] for volunteering opportunities  [id number]    [facility]  Call for volunteer opportunities [phone]    Kristi Dao MD Pediatric Medicine   [facility] [address] [phone]            Advanced Directive:   Does the patient have an appointed surrogate decision maker? No  Does the patient have a Medical Advance Directive? No  Does the patient have a Psychiatric Advance Directive? No  If the patient does not have a surrogate or Medical Advance Directive AND Psychiatric Advance Directive, the patient was offered information on these advance directives Patient will complete at a later time    Patient Instructions: Please continue all medications until otherwise directed by physician. Tobacco Cessation Discharge Plan:   Is the patient a smoker and needs referral for smoking cessation? Not applicable  Patient referred to the following for smoking cessation with an appointment? Not applicable     Patient was offered medication to assist with smoking cessation at discharge? Not applicable  Was education for smoking cessation added to the discharge instructions? Not applicable    Alcohol/Substance Abuse Discharge Plan:   Does the patient have a history of substance/alcohol abuse and requires a referral for treatment? Not applicable  Patient referred to the following for substance/alcohol abuse treatment with an appointment? Not applicable  Patient was offered medication to assist with alcohol cessation at discharge?  Not applicable  Was education for substance/alcohol abuse added to discharge instructions? Not applicable    Patient discharged to Home; provided to the patient/caregiver either in hard copy or electronically.

## 2022-02-07 NOTE — BH NOTES
Family Session    Writer facilitated family session with pt and pts mother, Deshawn Grimes. Pt reported that she felt 'at my lowest' when she was admitted and said that she felt 'not like myself'. Pt said she is happy that the doctor listened to concerns with medications and helped her to change them. Pt reports she is 'feeling hopeful'. Deshawn Grimes provided positive feedback to pt. Pt read a letter she wrote to herself about being okay, forgiving herself and staying hopeful. Deshawn Grimes and pt both said she sounds 'more like myself'. Writer explained PHP and pt is excited and willing to do this. Writer also encouraged safety planning around medications with mother providing meds to pt.  Pt will be staying with parents for the next few weeks as part of safety plan

## 2022-02-20 NOTE — DISCHARGE SUMMARY
[facility] [address] SUMMARY    Name:  Dinorah Brandon"  MR#:  [id number]  :  1995  ACCOUNT #:  [de-identified]  ADMIT DATE:  2022  DISCHARGE DATE:  2022    Please make reference to my initial psychiatric evaluation. CHIEF COMPLAINT:  \"I had a suicidal attempt, took Vraylar, Xanax, and Lexapro to kill self. \"    This is a 27-year-old single  female patient brought by the family. She felt that she keeps messing up, cannot find a job, cannot work, TBI, Neurology appointment on 2022, had a traumatic brain injury assessment. She has trouble keeping up, trouble being with people, unable to hold a job, depressed, felt that she is messing up, disappointed family. Apparently, family found her an apartment to help her establish independence, but it is not working out. Apparently, one time she took some contrast material, had a reaction and fell down, passed out and injured, and she is seeing a psychiatrist, taking Lexapro, Vraylar, Xanax, and amitriptyline. Recently, BuSpar was added. She also has headache, takes amitriptyline 100 mg and Topamax twice a day. Her self-esteem is low, depressed. COURSE AND TREATMENT DURING HOSPITALIZATION:  The patient was put on close observation, Medical consult, individual therapy, group therapy to help improve self-esteem, learn coping skills, stress management. Medications were also adjusted. She was cooperative, amenable, attended all the groups and classes, benefited from it, and she was subsequently discharged. ALLERGIES TO MEDICATIONS:  CONTRAST AGENT, IODINE. SURGERIES:  None done here. LABORATORY DATA:  COVID, influenza A and B are not detected. Pregnancy test negative. Urinalysis:  Wbc's 5-10, leukocyte esterase negative, nitrites negative. Drug screen is positive for benzodiazepine and THC. Blood chemistry:  Chloride 110, otherwise unremarkable. CBC:  WBC 12.8, neutrophils 75.   Salicylates 1.7, acetaminophen 10, Keppra 4.6. EKG is unremarkable, prolonged QT. Height 5 feet 5 inches, weight 82 kg. VITAL SIGNS:  Blood pressure 114/81, pulse 68, temperature 98.1, respirations 18. DISCHARGE MEDICATIONS:  1. Hydroxyzine 25 mg t.i.d. p.r.n.  2.  Trazodone 50 mg nightly. 3.  Keppra 500 mg twice a day. 4.  Abilify 2 mg daily. 5.  KCl 20 mEq daily. 6.  Melatonin 3 mg at night. 7.  Protonix 40 mg daily before dinner. 8.  Effexor XR 37.5 mg one capsule daily. 9.  Xanax 0.5 mg t.i.d. p.r.n. 10.  Lexapro 20 mg daily. 11.  Metoprolol 25 mg twice a day. 12. Topiramate 100 mg twice a day. 13.  Spironolactone 100 mg one tablet daily. 14.  Nortriptyline 75 mg nightly. DIAGNOSES:  Major depression, recurrent, acute, severe, without psychosis. Suicidal attempt, overdosed pills. History of seizure disorder. Discharged as improved. Follow up with primary care physician and also Dr. Dena Medina in Neuropsychology appointment. Attend  Hospital partial hospital program.  Counsel[facility] with [facility]. [facility]. Stable. Not suicidal.  Not homicidal.  Not psychotic. No weapons.       Ragini Cornell MD      [id number]  D:  02/20/2022 9:30  T:  02/20/2022 11:46  JOB #:  [id number]

## 2022-03-23 ENCOUNTER — OFFICE VISIT (OUTPATIENT)
Dept: ENT CLINIC | Age: 27
End: 2022-03-23
Payer: COMMERCIAL

## 2022-03-23 VITALS
HEART RATE: 82 BPM | TEMPERATURE: 97.6 F | HEIGHT: 62 IN | OXYGEN SATURATION: 99 % | RESPIRATION RATE: 16 BRPM | DIASTOLIC BLOOD PRESSURE: 72 MMHG | BODY MASS INDEX: 20.98 KG/M2 | SYSTOLIC BLOOD PRESSURE: 112 MMHG | WEIGHT: 114 LBS

## 2022-03-23 DIAGNOSIS — H61.23 BILATERAL IMPACTED CERUMEN: Primary | ICD-10-CM

## 2022-03-23 PROCEDURE — 99204 OFFICE O/P NEW MOD 45 MIN: CPT | Performed by: NURSE PRACTITIONER

## 2022-03-23 PROCEDURE — 69210 REMOVE IMPACTED EAR WAX UNI: CPT | Performed by: NURSE PRACTITIONER

## 2022-03-23 NOTE — PROGRESS NOTES
Visit Vitals  /72 (BP 1 Location: Left upper arm, BP Patient Position: Sitting, BP Cuff Size: Large adult)   Pulse 82   Temp 97.6 °F (36.4 °C) (Temporal)   Resp 16   Ht 5' 2\" (1.575 m)   Wt 114 lb (51.7 kg)   SpO2 99%   BMI 20.85 kg/m²     Chief Complaint   Patient presents with    New Patient     wax build up in ears

## 2022-03-23 NOTE — PROGRESS NOTES
Otolaryngology-Head and Neck Surgery  New Patient Visit     Patient: Gema Goodman  YOB: 1995  MRN: [id number]  Date of Service: 3/23/2022    Chief Complaint: Bilateral ears clogged    History of Present Illness: Gema Goodman is a 23y.o. year old female who presents today accompanied by mother for discussion of     Reports bilateral ears clogged for 2 months  +left otalgia, left jawline, muffled hearing, tinnitus  Denies otorrhea, dizziness    Describes as intermittent high-pitch beep  Hearing test as child; normal  No family Hx of hearing loss  Hx of tonsillectomy, left ear graft  Hx of TBI    Past Medical History:  Past Medical History:   Diagnosis Date    Other ill-defined conditions(799.89) 10/2010    left tympanic membrane defect    Other ill-defined conditions(799.89)     TM perforation    Suicidal behavior with attempted self-injury ([facility] [address]     Suicidal ideation        Past Surgical History:   Past Surgical History:   Procedure Laterality Date    HX HEENT       tonsilectomy and adenoidectomy       Medications:   Current Outpatient Medications   Medication Instructions    ALPRAZolam (XANAX) 0.5 mg, Oral, 3 TIMES DAILY AS NEEDED    ARIPiprazole (ABILIFY) 2 mg, Oral, DAILY    escitalopram oxalate (LEXAPRO) 20 mg, Oral, DAILY    Estradiol Valerate-Dienogest (Natazia) 3 mg/2 mg-2 mg/ 2 mg-3 mg/1 mg tab 1 Tablet, Oral, DAILY    levETIRAcetam (KEPPRA) 500 mg, Oral, 2 TIMES DAILY    melatonin 3 mg, Oral, EVERY BEDTIME    metoprolol tartrate (LOPRESSOR) 12.5 mg, Oral, 2 TIMES DAILY    nortriptyline (PAMELOR) 75 mg, Oral, EVERY BEDTIME    pantoprazole (PROTONIX) 40 mg, Oral, 2 TIMES DAILY BEFORE MEALS    potassium chloride (K-DUR, KLOR-CON M20) 20 mEq tablet 20 mEq, Oral, DAILY    spironolactone (ALDACTONE) 100 mg, Oral, DAILY    topiramate (TOPAMAX) 100 mg, Oral, 2 TIMES DAILY    traZODone (DESYREL) 50 mg, Oral, BEDTIME PRN    venlafaxine-SR (EFFEXOR-XR) 37.5 mg, Oral, DAILY WITH BREAKFAST       Allergies: Allergies   Allergen Reactions    Contrast Agent [Iodine] Unknown (comments)       Social History:   Social History     Tobacco Use    Smoking status: Never Smoker    Smokeless tobacco: Never Used   Substance Use Topics    Alcohol use: Not Currently    Drug use: Yes     Types: Marijuana        Family History:  History reviewed. No pertinent family history. Review of Systems:    Consitutional: denies fever, excessive weight gain or loss. Eyes: denies diplopia, eye pain. Integumentary: denies new concerning skin lesions. Ears, Nose, Mouth, Throat: denies except as per HPI. Endocrine: denies hot or cold intolerance, increased thirst.  Respiratory: denies cough, hemoptysis, wheezing  Gastrointestinal: denies trouble swallowing, nausea, emesis, regurgitation  Musculoskeletal: denies muscle weakness or wasting  Cardiovascular: denies chest pain, shortness of breath  Neurologic: denies seizures, numbness or tingling, syncope  Hematologic: denies easy bleeding or bruising    Physical Examination:   Vitals:    03/23/22 1457   BP: 112/72   BP 1 Location: Left upper arm   BP Patient Position: Sitting   BP Cuff Size: Large adult   Pulse: 82   Temp: 97.6 °F (36.4 °C)   TempSrc: Temporal   Resp: 16   Height: 5' 2\" (1.575 m)   Weight: 114 lb (51.7 kg)   SpO2: 99%        General: Comfortable, pleasant, appears stated age  Voice: Strong, speaking in full sentences, no stridor    Face: No masses or lesions, facial strength symmetric   Ears: External ears unremarkable. Bilateral ear canal impacted with cerumen. Nose: External nose unremarkable. Dorsum midline. Anterior rhinoscopy demonstrates no lesions. Septum midline. Turbinates without hypertrophy. Oral Cavity / Oropharynx: No trismus. Mucosa pink and moist. No lesions. Tongue is midline and mobile. Palate elevates symmetrically. Uvula midline. Tonsils absent. Base of tongue soft. Floor of mouth soft. Neck: Supple. No adenopathy.  Thyroid unremarkable. Palpable laryngeal landmarks. Full neck range of motion   Neurologic: CN II - XI intact. Normal gait       Procedure - Removal Impacted Cerumen    Indications: cerumen impaction    Ears are examined under microscope/headlight.  bilateral ears are cleaned using otologic curette, suction, and/or alligator forceps. Mineral oil is instilled in both ears. Results: Procedure successful. Bilateral EC clear, TM intact. Left ear with notable sclerosis and graft site visible on right lateral side. Right ear TM with notable sclerosis. Assessment and Plan:   1. Cerumen impaction of both ears     -Cerumen handout given. -Return in 6 months for ear cleaning.              Joe Nuñez MSN, FNP-C  [facility]  [address] [place] [address]  Office Phone: [phone]

## 2022-09-02 ENCOUNTER — TELEPHONE (OUTPATIENT)
Dept: ENT CLINIC | Age: 27
End: 2022-09-02

## 2022-09-02 NOTE — TELEPHONE ENCOUNTER
Attempted to reach Lizzy Taylor to confirm next appointment and left a voicemail asking the patient to call back to confirm.